# Patient Record
Sex: FEMALE | Race: WHITE | Employment: FULL TIME | URBAN - METROPOLITAN AREA
[De-identification: names, ages, dates, MRNs, and addresses within clinical notes are randomized per-mention and may not be internally consistent; named-entity substitution may affect disease eponyms.]

---

## 2017-01-12 ENCOUNTER — E-VISIT (OUTPATIENT)
Dept: FAMILY MEDICINE CLINIC | Age: 27
End: 2017-01-12

## 2017-01-12 PROCEDURE — 99444 PR PHYSICIAN ONLINE EVALUATION & MANAGEMENT SERVICE: CPT | Performed by: FAMILY MEDICINE

## 2017-01-12 RX ORDER — CEFUROXIME AXETIL 250 MG/1
250 TABLET ORAL 2 TIMES DAILY
Qty: 20 TABLET | Refills: 0 | Status: SHIPPED | OUTPATIENT
Start: 2017-01-12 | End: 2017-01-22

## 2017-01-12 ASSESSMENT — LIFESTYLE VARIABLES: SMOKING_STATUS: NO

## 2017-02-28 ENCOUNTER — OFFICE VISIT (OUTPATIENT)
Dept: FAMILY MEDICINE CLINIC | Age: 27
End: 2017-02-28

## 2017-02-28 VITALS
SYSTOLIC BLOOD PRESSURE: 132 MMHG | HEIGHT: 64 IN | DIASTOLIC BLOOD PRESSURE: 82 MMHG | TEMPERATURE: 97.9 F | BODY MASS INDEX: 26.67 KG/M2 | WEIGHT: 156.2 LBS

## 2017-02-28 DIAGNOSIS — M54.2 NECK PAIN: Primary | ICD-10-CM

## 2017-02-28 PROCEDURE — 99213 OFFICE O/P EST LOW 20 MIN: CPT | Performed by: FAMILY MEDICINE

## 2017-02-28 RX ORDER — PREDNISONE 10 MG/1
TABLET ORAL
Qty: 21 TABLET | Refills: 0 | Status: SHIPPED | OUTPATIENT
Start: 2017-02-28 | End: 2017-03-10

## 2017-02-28 RX ORDER — CYCLOBENZAPRINE HCL 10 MG
TABLET ORAL
Qty: 10 TABLET | Refills: 0 | Status: SHIPPED | OUTPATIENT
Start: 2017-02-28 | End: 2017-03-10

## 2017-03-06 ASSESSMENT — ENCOUNTER SYMPTOMS
SHORTNESS OF BREATH: 0
ABDOMINAL PAIN: 0

## 2017-10-31 ENCOUNTER — OFFICE VISIT (OUTPATIENT)
Dept: FAMILY MEDICINE CLINIC | Age: 27
End: 2017-10-31

## 2017-10-31 VITALS
TEMPERATURE: 98.4 F | WEIGHT: 168 LBS | DIASTOLIC BLOOD PRESSURE: 60 MMHG | HEIGHT: 64 IN | HEART RATE: 80 BPM | SYSTOLIC BLOOD PRESSURE: 110 MMHG | BODY MASS INDEX: 28.68 KG/M2

## 2017-10-31 DIAGNOSIS — Z23 NEED FOR INFLUENZA VACCINATION: ICD-10-CM

## 2017-10-31 DIAGNOSIS — M54.2 NECK PAIN ON RIGHT SIDE: Primary | ICD-10-CM

## 2017-10-31 PROCEDURE — 99213 OFFICE O/P EST LOW 20 MIN: CPT | Performed by: FAMILY MEDICINE

## 2017-10-31 PROCEDURE — 90686 IIV4 VACC NO PRSV 0.5 ML IM: CPT | Performed by: FAMILY MEDICINE

## 2017-10-31 PROCEDURE — 90471 IMMUNIZATION ADMIN: CPT | Performed by: FAMILY MEDICINE

## 2017-10-31 RX ORDER — CEPHALEXIN 500 MG/1
500 CAPSULE ORAL 3 TIMES DAILY
Qty: 30 CAPSULE | Refills: 0 | Status: SHIPPED | OUTPATIENT
Start: 2017-10-31 | End: 2017-11-10

## 2017-11-17 ASSESSMENT — ENCOUNTER SYMPTOMS
SHORTNESS OF BREATH: 0
ABDOMINAL PAIN: 0

## 2018-03-07 PROBLEM — O14.20 HELLP (HEMOLYTIC ANEMIA/ELEV LIVER ENZYMES/LOW PLATELETS IN PREGNANCY): Status: ACTIVE | Noted: 2018-03-07

## 2018-03-09 PROBLEM — O14.13 PREECLAMPSIA, SEVERE, THIRD TRIMESTER: Status: ACTIVE | Noted: 2018-03-09

## 2018-04-05 DIAGNOSIS — R89.9 ABNORMAL LABORATORY TEST RESULT: Primary | ICD-10-CM

## 2018-04-10 DIAGNOSIS — R89.9 ABNORMAL LABORATORY TEST RESULT: ICD-10-CM

## 2018-04-10 LAB
ALBUMIN SERPL-MCNC: 4.1 G/DL (ref 3.4–5)
ALP BLD-CCNC: 111 U/L (ref 40–129)
ALT SERPL-CCNC: 29 U/L (ref 10–40)
AST SERPL-CCNC: 23 U/L (ref 15–37)
BASOPHILS ABSOLUTE: 0 K/UL (ref 0–0.2)
BASOPHILS RELATIVE PERCENT: 0.5 %
BILIRUB SERPL-MCNC: 0.5 MG/DL (ref 0–1)
BILIRUBIN DIRECT: <0.2 MG/DL (ref 0–0.3)
BILIRUBIN, INDIRECT: NORMAL MG/DL (ref 0–1)
EOSINOPHILS ABSOLUTE: 1.2 K/UL (ref 0–0.6)
EOSINOPHILS RELATIVE PERCENT: 22.2 %
HCT VFR BLD CALC: 38.5 % (ref 36–48)
HEMOGLOBIN: 13.2 G/DL (ref 12–16)
LYMPHOCYTES ABSOLUTE: 1.4 K/UL (ref 1–5.1)
LYMPHOCYTES RELATIVE PERCENT: 26 %
MCH RBC QN AUTO: 31 PG (ref 26–34)
MCHC RBC AUTO-ENTMCNC: 34.2 G/DL (ref 31–36)
MCV RBC AUTO: 90.5 FL (ref 80–100)
MONOCYTES ABSOLUTE: 0.4 K/UL (ref 0–1.3)
MONOCYTES RELATIVE PERCENT: 6.9 %
NEUTROPHILS ABSOLUTE: 2.4 K/UL (ref 1.7–7.7)
NEUTROPHILS RELATIVE PERCENT: 44.4 %
PDW BLD-RTO: 13.6 % (ref 12.4–15.4)
PLATELET # BLD: 261 K/UL (ref 135–450)
PMV BLD AUTO: 7.2 FL (ref 5–10.5)
RBC # BLD: 4.25 M/UL (ref 4–5.2)
TOTAL PROTEIN: 6.5 G/DL (ref 6.4–8.2)
WBC # BLD: 5.4 K/UL (ref 4–11)

## 2018-11-14 RX ORDER — CEPHALEXIN 500 MG/1
500 CAPSULE ORAL 3 TIMES DAILY
Qty: 30 CAPSULE | Refills: 0 | Status: SHIPPED | OUTPATIENT
Start: 2018-11-14 | End: 2018-11-24

## 2019-06-04 ENCOUNTER — TELEPHONE (OUTPATIENT)
Dept: FAMILY MEDICINE CLINIC | Age: 29
End: 2019-06-04

## 2019-06-21 ENCOUNTER — OFFICE VISIT (OUTPATIENT)
Dept: FAMILY MEDICINE CLINIC | Age: 29
End: 2019-06-21
Payer: COMMERCIAL

## 2019-06-21 VITALS
WEIGHT: 153.2 LBS | DIASTOLIC BLOOD PRESSURE: 74 MMHG | SYSTOLIC BLOOD PRESSURE: 114 MMHG | BODY MASS INDEX: 26.15 KG/M2 | HEIGHT: 64 IN | TEMPERATURE: 98.5 F

## 2019-06-21 DIAGNOSIS — J01.90 ACUTE BACTERIAL SINUSITIS: Primary | ICD-10-CM

## 2019-06-21 DIAGNOSIS — B96.89 ACUTE BACTERIAL SINUSITIS: Primary | ICD-10-CM

## 2019-06-21 PROCEDURE — 99213 OFFICE O/P EST LOW 20 MIN: CPT | Performed by: INTERNAL MEDICINE

## 2019-06-21 RX ORDER — CEFUROXIME AXETIL 250 MG/1
250 TABLET ORAL 2 TIMES DAILY
Qty: 20 TABLET | Refills: 0 | Status: SHIPPED | OUTPATIENT
Start: 2019-06-21 | End: 2019-07-01

## 2019-06-21 ASSESSMENT — ENCOUNTER SYMPTOMS
BLOOD IN STOOL: 0
SINUS PAIN: 1
ABDOMINAL PAIN: 0
COUGH: 1
CONSTIPATION: 0
DIARRHEA: 0
SINUS PRESSURE: 1
SORE THROAT: 1

## 2019-06-21 ASSESSMENT — PATIENT HEALTH QUESTIONNAIRE - PHQ9
2. FEELING DOWN, DEPRESSED OR HOPELESS: 0
SUM OF ALL RESPONSES TO PHQ QUESTIONS 1-9: 0
1. LITTLE INTEREST OR PLEASURE IN DOING THINGS: 0
SUM OF ALL RESPONSES TO PHQ9 QUESTIONS 1 & 2: 0
SUM OF ALL RESPONSES TO PHQ QUESTIONS 1-9: 0

## 2019-06-21 NOTE — PROGRESS NOTES
stool, constipation and diarrhea. Genitourinary: Negative for dysuria. Objective:   Physical Exam   Constitutional: She appears well-developed and well-nourished. HENT:   Head: Normocephalic and atraumatic. Edema bilat. nasal turbinates with drainage . Cardiovascular: Normal rate and regular rhythm. Exam reveals no friction rub. No murmur heard. Pulmonary/Chest: No stridor. No respiratory distress. Abdominal: Soft. She exhibits no distension. There is no tenderness. Musculoskeletal: She exhibits no edema. Assessment:       Diagnosis Orders   1. Acute bacterial sinusitis           Plan:      Outpatient Encounter Medications as of 6/21/2019   Medication Sig Dispense Refill    cefUROXime (CEFTIN) 250 MG tablet Take 1 tablet by mouth 2 times daily for 10 days 20 tablet 0    [DISCONTINUED] ibuprofen (ADVIL;MOTRIN) 800 MG tablet Take 1 tablet by mouth every 8 hours as needed for Pain 120 tablet 3    [DISCONTINUED] docusate (COLACE, DULCOLAX) 100 MG CAPS Take 100 mg by mouth 2 times daily 30 capsule 1    [DISCONTINUED] lanolin ointment Apply topically as needed. 1 Tube 3    [DISCONTINUED] Prenatal Vit-Fe Fumarate-FA (PRENATAL VITAMIN PO) Take by mouth       No facility-administered encounter medications on file as of 6/21/2019. No orders of the defined types were placed in this encounter.           Blaise GLEZ DO

## 2019-06-21 NOTE — PATIENT INSTRUCTIONS
Thank you for choosing [facility]. Please bring a current list of medications to every appointment. Please contact your pharmacy for any prescription refill(s) that you are requesting.

## 2019-06-27 ENCOUNTER — TELEPHONE (OUTPATIENT)
Dept: FAMILY MEDICINE CLINIC | Age: 29
End: 2019-06-27

## 2019-06-27 DIAGNOSIS — Z00.00 ROUTINE GENERAL MEDICAL EXAMINATION AT A HEALTH CARE FACILITY: Primary | ICD-10-CM

## 2019-06-27 NOTE — TELEPHONE ENCOUNTER
Pt has an appt on July 19, 2019 for a cpe, pt is needing blood work prior to appt. Pl advise.  [phone] (home)

## 2019-06-28 NOTE — TELEPHONE ENCOUNTER
[phone] ([place])  - Left msg for Arianna Piedra stating that lab orders are in Epic and to be fasting at least 10-12 hours.

## 2019-07-12 DIAGNOSIS — Z00.00 ROUTINE GENERAL MEDICAL EXAMINATION AT A HEALTH CARE FACILITY: ICD-10-CM

## 2019-07-12 LAB
ANION GAP SERPL CALCULATED.3IONS-SCNC: 10 MMOL/L (ref 3–16)
BUN BLDV-MCNC: 13 MG/DL (ref 7–20)
CALCIUM SERPL-MCNC: 9.4 MG/DL (ref 8.3–10.6)
CHLORIDE BLD-SCNC: 105 MMOL/L (ref 99–110)
CHOLESTEROL, TOTAL: 177 MG/DL (ref 0–199)
CO2: 25 MMOL/L (ref 21–32)
CREAT SERPL-MCNC: 0.8 MG/DL (ref 0.6–1.1)
GFR AFRICAN AMERICAN: >60
GFR NON-AFRICAN AMERICAN: >60
GLUCOSE BLD-MCNC: 93 MG/DL (ref 70–99)
HDLC SERPL-MCNC: 55 MG/DL (ref 40–60)
LDL CHOLESTEROL CALCULATED: 112 MG/DL
POTASSIUM SERPL-SCNC: 5.3 MMOL/L (ref 3.5–5.1)
SODIUM BLD-SCNC: 140 MMOL/L (ref 136–145)
TRIGL SERPL-MCNC: 52 MG/DL (ref 0–150)
VLDLC SERPL CALC-MCNC: 10 MG/DL

## 2019-07-19 ENCOUNTER — OFFICE VISIT (OUTPATIENT)
Dept: FAMILY MEDICINE CLINIC | Age: 29
End: 2019-07-19
Payer: COMMERCIAL

## 2019-07-19 VITALS
BODY MASS INDEX: 26.46 KG/M2 | TEMPERATURE: 97.5 F | SYSTOLIC BLOOD PRESSURE: 120 MMHG | DIASTOLIC BLOOD PRESSURE: 78 MMHG | HEIGHT: 64 IN | WEIGHT: 155 LBS

## 2019-07-19 DIAGNOSIS — Z00.00 PREVENTATIVE HEALTH CARE: Primary | ICD-10-CM

## 2019-07-19 DIAGNOSIS — Z00.00 PREVENTATIVE HEALTH CARE: ICD-10-CM

## 2019-07-19 PROCEDURE — 90471 IMMUNIZATION ADMIN: CPT | Performed by: FAMILY MEDICINE

## 2019-07-19 PROCEDURE — 99395 PREV VISIT EST AGE 18-39: CPT | Performed by: FAMILY MEDICINE

## 2019-07-19 PROCEDURE — 90632 HEPA VACCINE ADULT IM: CPT | Performed by: FAMILY MEDICINE

## 2019-07-20 LAB
ALBUMIN SERPL-MCNC: 4.4 G/DL (ref 3.4–5)
ALP BLD-CCNC: 49 U/L (ref 40–129)
ALT SERPL-CCNC: 14 U/L (ref 10–40)
AST SERPL-CCNC: 21 U/L (ref 15–37)
BILIRUB SERPL-MCNC: 0.7 MG/DL (ref 0–1)
BILIRUBIN DIRECT: <0.2 MG/DL (ref 0–0.3)
BILIRUBIN, INDIRECT: NORMAL MG/DL (ref 0–1)
TOTAL PROTEIN: 6.8 G/DL (ref 6.4–8.2)

## 2019-08-05 ASSESSMENT — ENCOUNTER SYMPTOMS
BLOOD IN STOOL: 0
EYE DISCHARGE: 0
WHEEZING: 0
EYE REDNESS: 0
NAUSEA: 0
SHORTNESS OF BREATH: 0
CHEST TIGHTNESS: 0
VOMITING: 0
TROUBLE SWALLOWING: 0

## 2019-10-07 ENCOUNTER — NURSE ONLY (OUTPATIENT)
Dept: FAMILY MEDICINE CLINIC | Age: 29
End: 2019-10-07
Payer: COMMERCIAL

## 2019-10-07 DIAGNOSIS — Z23 NEEDS FLU SHOT: Primary | ICD-10-CM

## 2019-10-07 PROCEDURE — 90686 IIV4 VACC NO PRSV 0.5 ML IM: CPT | Performed by: FAMILY MEDICINE

## 2019-10-07 PROCEDURE — 90471 IMMUNIZATION ADMIN: CPT | Performed by: FAMILY MEDICINE

## 2019-12-02 LAB — HIV AG/AB: NORMAL

## 2020-10-02 ENCOUNTER — NURSE ONLY (OUTPATIENT)
Dept: FAMILY MEDICINE CLINIC | Age: 30
End: 2020-10-02
Payer: COMMERCIAL

## 2020-10-02 PROCEDURE — 90686 IIV4 VACC NO PRSV 0.5 ML IM: CPT | Performed by: INTERNAL MEDICINE

## 2020-10-02 PROCEDURE — 90471 IMMUNIZATION ADMIN: CPT | Performed by: INTERNAL MEDICINE

## 2020-11-02 ENCOUNTER — OFFICE VISIT (OUTPATIENT)
Dept: ORTHOPEDIC SURGERY | Age: 30
End: 2020-11-02
Payer: COMMERCIAL

## 2020-11-02 VITALS — HEIGHT: 64 IN | TEMPERATURE: 97.5 F | BODY MASS INDEX: 26.46 KG/M2 | WEIGHT: 155 LBS

## 2020-11-02 PROBLEM — M67.432 GANGLION CYST OF DORSUM OF LEFT WRIST: Status: ACTIVE | Noted: 2020-11-02

## 2020-11-02 PROCEDURE — G8427 DOCREV CUR MEDS BY ELIG CLIN: HCPCS | Performed by: ORTHOPAEDIC SURGERY

## 2020-11-02 PROCEDURE — 1036F TOBACCO NON-USER: CPT | Performed by: ORTHOPAEDIC SURGERY

## 2020-11-02 PROCEDURE — 99203 OFFICE O/P NEW LOW 30 MIN: CPT | Performed by: ORTHOPAEDIC SURGERY

## 2020-11-02 PROCEDURE — G8482 FLU IMMUNIZE ORDER/ADMIN: HCPCS | Performed by: ORTHOPAEDIC SURGERY

## 2020-11-02 PROCEDURE — G8419 CALC BMI OUT NRM PARAM NOF/U: HCPCS | Performed by: ORTHOPAEDIC SURGERY

## 2020-11-02 NOTE — PROGRESS NOTES
including the possible risks, complications, prognosis and postoperative care. All questions are answered. The cyst is too small and flat to be aspirated with a large needle at this time. If she feels that she would like to proceed with surgery, she will call me and be scheduled with Dr. Carolina Taylor.

## 2021-02-03 ENCOUNTER — HOSPITAL ENCOUNTER (OUTPATIENT)
Age: 31
Discharge: HOME | End: 2021-02-03
Payer: COMMERCIAL

## 2021-02-03 ENCOUNTER — OFFICE VISIT (OUTPATIENT)
Dept: FAMILY MEDICINE CLINIC | Age: 31
End: 2021-02-03
Payer: COMMERCIAL

## 2021-02-03 ENCOUNTER — HOSPITAL ENCOUNTER (OUTPATIENT)
Dept: GENERAL RADIOLOGY | Age: 31
Discharge: HOME | End: 2021-02-03
Payer: COMMERCIAL

## 2021-02-03 VITALS
OXYGEN SATURATION: 98 % | SYSTOLIC BLOOD PRESSURE: 116 MMHG | BODY MASS INDEX: 27.96 KG/M2 | WEIGHT: 163.8 LBS | HEART RATE: 65 BPM | HEIGHT: 64 IN | DIASTOLIC BLOOD PRESSURE: 76 MMHG | TEMPERATURE: 97.2 F

## 2021-02-03 DIAGNOSIS — M25.522 LEFT ELBOW PAIN: Primary | ICD-10-CM

## 2021-02-03 DIAGNOSIS — M25.522 LEFT ELBOW PAIN: ICD-10-CM

## 2021-02-03 PROCEDURE — 99213 OFFICE O/P EST LOW 20 MIN: CPT | Performed by: INTERNAL MEDICINE

## 2021-02-03 PROCEDURE — G8419 CALC BMI OUT NRM PARAM NOF/U: HCPCS | Performed by: INTERNAL MEDICINE

## 2021-02-03 PROCEDURE — 1036F TOBACCO NON-USER: CPT | Performed by: INTERNAL MEDICINE

## 2021-02-03 PROCEDURE — 73080 X-RAY EXAM OF ELBOW: CPT

## 2021-02-03 PROCEDURE — G8482 FLU IMMUNIZE ORDER/ADMIN: HCPCS | Performed by: INTERNAL MEDICINE

## 2021-02-03 PROCEDURE — G8427 DOCREV CUR MEDS BY ELIG CLIN: HCPCS | Performed by: INTERNAL MEDICINE

## 2021-02-03 RX ORDER — PHENOL 1.4 %
1 AEROSOL, SPRAY (ML) MUCOUS MEMBRANE DAILY
COMMUNITY

## 2021-02-03 RX ORDER — CYCLOBENZAPRINE HCL 5 MG
5 TABLET ORAL 3 TIMES DAILY PRN
Qty: 30 TABLET | Refills: 0 | Status: SHIPPED | OUTPATIENT
Start: 2021-02-03 | End: 2021-02-13

## 2021-02-03 RX ORDER — PREDNISONE 20 MG/1
TABLET ORAL
Qty: 11 TABLET | Refills: 0 | Status: SHIPPED | OUTPATIENT
Start: 2021-02-03 | End: 2021-08-10 | Stop reason: ALTCHOICE

## 2021-02-03 ASSESSMENT — PATIENT HEALTH QUESTIONNAIRE - PHQ9
2. FEELING DOWN, DEPRESSED OR HOPELESS: 0
SUM OF ALL RESPONSES TO PHQ QUESTIONS 1-9: 0
SUM OF ALL RESPONSES TO PHQ QUESTIONS 1-9: 0

## 2021-02-03 NOTE — PROGRESS NOTES
SUBJECTIVE:  Zuleyma Louie is a 27 y.o. female being evaluated for:    Chief Complaint   Patient presents with    Arm Pain     left arm pain x 3 days, no injury. hold's her 11 month old in her left arm not sure if related. taking ibu/tyl prn with relief. HPI   Left forearm pain and now going all the way up to her shoulder and down to her hand. Woke up on Sunday with it  Couldn't  her baby  Icing it and using tylenonl and motrin without relief Feels warn out like did a big work out  it feels tired  If moves it it feels very tight bradley in elbow and biceps area  Feels as if muscles are knotted   NO trauma to her arm    Allergies   Allergen Reactions    Neomycin-Polymyxin-Gramicidin Rash           Polysporin [Bacitracin-Polymyxin B] Rash     Rash/itching       Current Outpatient Medications   Medication Sig Dispense Refill    SPRINTEC 28 0.25-35 MG-MCG per tablet Take 1 tablet by mouth daily      Multiple Vitamins-Minerals (MULTIVITAMIN WOMEN) TABS Take 1 tablet by mouth daily      predniSONE (DELTASONE) 20 MG tablet 2 pills daily for 3 days, 1 pill daily for 3 days, 1/2 pill daily for 3 days 11 tablet 0    cyclobenzaprine (FLEXERIL) 5 MG tablet Take 1 tablet by mouth 3 times daily as needed for Muscle spasms 30 tablet 0     No current facility-administered medications for this visit. Social History     Socioeconomic History    Marital status:      Spouse name: Not on file    Number of children: Not on file    Years of education: Not on file    Highest education level: Not on file   Occupational History    Not on file   Social Needs    Financial resource strain: Not on file    Food insecurity     Worry: Not on file     Inability: Not on file    Transportation needs     Medical: Not on file     Non-medical: Not on file   Tobacco Use    Smoking status: Never Smoker    Smokeless tobacco: Never Used   Substance and Sexual Activity    Alcohol use: No    Drug use:  No  Sexual activity: Yes     Partners: Male   Lifestyle    Physical activity     Days per week: Not on file     Minutes per session: Not on file    Stress: Not on file   Relationships    Social connections     Talks on phone: Not on file     Gets together: Not on file     Attends Buddhism service: Not on file     Active member of club or organization: Not on file     Attends meetings of clubs or organizations: Not on file     Relationship status: Not on file    Intimate partner violence     Fear of current or ex partner: Not on file     Emotionally abused: Not on file     Physically abused: Not on file     Forced sexual activity: Not on file   Other Topics Concern    Not on file   Social History Narrative    Not on file      No past medical history on file. Past Surgical History:   Procedure Laterality Date    ADENOIDECTOMY AND TYMPANOSTOMY TUBE PLACEMENT       SECTION  2020    TONSILLECTOMY      TONSILLECTOMY AND ADENOIDECTOMY      WISDOM TOOTH EXTRACTION         Review of Systems   Constitutional: Negative for chills and fever. Respiratory: Negative for cough and shortness of breath. Cardiovascular: Negative for chest pain, palpitations and leg swelling. Gastrointestinal: Negative for abdominal pain, blood in stool, diarrhea, nausea and vomiting. Genitourinary: Negative for menstrual problem (started menses yesterday Not pregnant ). Musculoskeletal: Positive for arthralgias and myalgias. Negative for neck pain. Neurological: Negative for dizziness, light-headedness and headaches. OBJECTIVE:  /76   Pulse 65   Temp 97.2 °F (36.2 °C) (Temporal)   Ht 5' 4\" (1.626 m)   Wt 163 lb 12.8 oz (74.3 kg)   LMP 2021 (Exact Date)   SpO2 98%   Breastfeeding No   BMI 28.12 kg/m²      Body mass index is 28.12 kg/m². Physical Exam  Constitutional:       General: She is not in acute distress. Appearance: Normal appearance.    HENT: Head: Normocephalic and atraumatic. Eyes:      Conjunctiva/sclera: Conjunctivae normal.   Neck:      Musculoskeletal: Normal range of motion and neck supple. No neck rigidity or muscular tenderness. Cardiovascular:      Rate and Rhythm: Normal rate and regular rhythm. Heart sounds: Normal heart sounds. No murmur. No friction rub. No gallop. Pulmonary:      Effort: Pulmonary effort is normal.      Breath sounds: Normal breath sounds. Abdominal:      General: There is no distension. Palpations: Abdomen is soft. Tenderness: There is no abdominal tenderness. Comments: No hepatosplenomegaly   Musculoskeletal:         General: Tenderness present. No swelling. Comments: Tenderness over biceps in the left arm and left medial epicondylar area   Lymphadenopathy:      Cervical: No cervical adenopathy. Skin:     General: Skin is warm and dry. Findings: No erythema. Neurological:      General: No focal deficit present. Mental Status: She is alert. Gait: Gait normal.         ASSESSMENT/PLAN:    Negar Barajas was seen today for arm pain. Diagnoses and all orders for this visit:    Left elbow pain  -     XR ELBOW LEFT (MIN 3 VIEWS); Future  -     predniSONE (DELTASONE) 20 MG tablet; 2 pills daily for 3 days, 1 pill daily for 3 days, 1/2 pill daily for 3 days  -     cyclobenzaprine (FLEXERIL) 5 MG tablet; Take 1 tablet by mouth 3 times daily as needed for Muscle spasms        Orders Placed This Encounter   Medications    predniSONE (DELTASONE) 20 MG tablet     Si pills daily for 3 days, 1 pill daily for 3 days, 1/2 pill daily for 3 days     Dispense:  11 tablet     Refill:  0    cyclobenzaprine (FLEXERIL) 5 MG tablet     Sig: Take 1 tablet by mouth 3 times daily as needed for Muscle spasms     Dispense:  30 tablet     Refill:  0        Return if symptoms worsen or fail to improve. There are no Patient Instructions on file for this visit.

## 2021-02-13 ASSESSMENT — ENCOUNTER SYMPTOMS
SHORTNESS OF BREATH: 0
NAUSEA: 0
DIARRHEA: 0
COUGH: 0
ABDOMINAL PAIN: 0
VOMITING: 0
BLOOD IN STOOL: 0

## 2021-07-15 ENCOUNTER — TELEPHONE (OUTPATIENT)
Dept: FAMILY MEDICINE CLINIC | Age: 31
End: 2021-07-15

## 2021-07-19 DIAGNOSIS — Z76.89 ENCOUNTER TO ESTABLISH CARE: Primary | ICD-10-CM

## 2021-07-26 DIAGNOSIS — Z76.89 ENCOUNTER TO ESTABLISH CARE: ICD-10-CM

## 2021-07-26 LAB
A/G RATIO: 1.8 (ref 1.1–2.2)
ALBUMIN SERPL-MCNC: 4.3 G/DL (ref 3.4–5)
ALP BLD-CCNC: 60 U/L (ref 40–129)
ALT SERPL-CCNC: 21 U/L (ref 10–40)
ANION GAP SERPL CALCULATED.3IONS-SCNC: 10 MMOL/L (ref 3–16)
AST SERPL-CCNC: 18 U/L (ref 15–37)
BILIRUB SERPL-MCNC: 0.7 MG/DL (ref 0–1)
BUN BLDV-MCNC: 14 MG/DL (ref 7–20)
CALCIUM SERPL-MCNC: 9.1 MG/DL (ref 8.3–10.6)
CHLORIDE BLD-SCNC: 100 MMOL/L (ref 99–110)
CO2: 27 MMOL/L (ref 21–32)
CREAT SERPL-MCNC: 0.9 MG/DL (ref 0.6–1.1)
GFR AFRICAN AMERICAN: >60
GFR NON-AFRICAN AMERICAN: >60
GLOBULIN: 2.4 G/DL
GLUCOSE BLD-MCNC: 75 MG/DL (ref 70–99)
HCT VFR BLD CALC: 41.4 % (ref 36–48)
HEMOGLOBIN: 14.5 G/DL (ref 12–16)
MCH RBC QN AUTO: 31.5 PG (ref 26–34)
MCHC RBC AUTO-ENTMCNC: 35.1 G/DL (ref 31–36)
MCV RBC AUTO: 89.8 FL (ref 80–100)
PDW BLD-RTO: 12.4 % (ref 12.4–15.4)
PLATELET # BLD: NORMAL K/UL (ref 135–450)
PLATELET SLIDE REVIEW: NORMAL
PMV BLD AUTO: NORMAL FL (ref 5–10.5)
POTASSIUM SERPL-SCNC: 4.6 MMOL/L (ref 3.5–5.1)
RBC # BLD: 4.61 M/UL (ref 4–5.2)
SODIUM BLD-SCNC: 137 MMOL/L (ref 136–145)
TOTAL PROTEIN: 6.7 G/DL (ref 6.4–8.2)
WBC # BLD: 7 K/UL (ref 4–11)

## 2021-08-10 ENCOUNTER — OFFICE VISIT (OUTPATIENT)
Dept: FAMILY MEDICINE CLINIC | Age: 31
End: 2021-08-10
Payer: COMMERCIAL

## 2021-08-10 VITALS
TEMPERATURE: 98.6 F | DIASTOLIC BLOOD PRESSURE: 82 MMHG | HEART RATE: 91 BPM | OXYGEN SATURATION: 98 % | WEIGHT: 161 LBS | SYSTOLIC BLOOD PRESSURE: 120 MMHG | HEIGHT: 64 IN | BODY MASS INDEX: 27.49 KG/M2

## 2021-08-10 DIAGNOSIS — D69.1 ABNORMAL PLATELET AGGREGATION (HCC): ICD-10-CM

## 2021-08-10 DIAGNOSIS — M67.432 GANGLION CYST OF DORSUM OF LEFT WRIST: ICD-10-CM

## 2021-08-10 DIAGNOSIS — Z76.89 ENCOUNTER TO ESTABLISH CARE: Primary | ICD-10-CM

## 2021-08-10 PROBLEM — O14.20 HELLP (HEMOLYTIC ANEMIA/ELEV LIVER ENZYMES/LOW PLATELETS IN PREGNANCY): Status: RESOLVED | Noted: 2018-03-07 | Resolved: 2021-08-10

## 2021-08-10 PROBLEM — O14.13 PREECLAMPSIA, SEVERE, THIRD TRIMESTER: Status: RESOLVED | Noted: 2018-03-09 | Resolved: 2021-08-10

## 2021-08-10 PROCEDURE — G8419 CALC BMI OUT NRM PARAM NOF/U: HCPCS | Performed by: NURSE PRACTITIONER

## 2021-08-10 PROCEDURE — 1036F TOBACCO NON-USER: CPT | Performed by: NURSE PRACTITIONER

## 2021-08-10 PROCEDURE — 99203 OFFICE O/P NEW LOW 30 MIN: CPT | Performed by: NURSE PRACTITIONER

## 2021-08-10 PROCEDURE — G8427 DOCREV CUR MEDS BY ELIG CLIN: HCPCS | Performed by: NURSE PRACTITIONER

## 2021-08-10 RX ORDER — UBIDECARENONE 75 MG
50 CAPSULE ORAL DAILY
COMMUNITY
End: 2022-07-15

## 2021-08-10 SDOH — ECONOMIC STABILITY: FOOD INSECURITY: WITHIN THE PAST 12 MONTHS, YOU WORRIED THAT YOUR FOOD WOULD RUN OUT BEFORE YOU GOT MONEY TO BUY MORE.: PATIENT DECLINED

## 2021-08-10 SDOH — ECONOMIC STABILITY: FOOD INSECURITY: WITHIN THE PAST 12 MONTHS, THE FOOD YOU BOUGHT JUST DIDN'T LAST AND YOU DIDN'T HAVE MONEY TO GET MORE.: PATIENT DECLINED

## 2021-08-10 ASSESSMENT — ENCOUNTER SYMPTOMS
DIARRHEA: 0
APNEA: 0
CONSTIPATION: 0
ABDOMINAL PAIN: 0
TROUBLE SWALLOWING: 0
COUGH: 0
BLOOD IN STOOL: 0
BACK PAIN: 1
COLOR CHANGE: 0
VOMITING: 0
NAUSEA: 0
EYES NEGATIVE: 1
WHEEZING: 0
SHORTNESS OF BREATH: 0

## 2021-08-10 ASSESSMENT — SOCIAL DETERMINANTS OF HEALTH (SDOH): HOW HARD IS IT FOR YOU TO PAY FOR THE VERY BASICS LIKE FOOD, HOUSING, MEDICAL CARE, AND HEATING?: PATIENT DECLINED

## 2021-08-10 NOTE — PROGRESS NOTES
HISTORY AND PHYSICAL             Date: 8/10/2021        Patient Name: Michelle Leary     YOB: 1990      Age:  27 y.o. Chief Complaint     Chief Complaint   Patient presents with    Established New Doctor    Annual Exam          History Obtained From   patient    History of Present Illness   Presents today to establish care. Overall feels well and denies concerns or complaints. Goes to her chiropractor every other month for neck issues. 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 The cyst to her left wrist is chronic and ongoing. Seen ortho in past and discussed risks and benefits of surgical removal.  States she uses NSAIDs and brace as needed when her wrist hurts. History of HELLP syndrome with first child not second. However she reported her blood work often needed redrawn due to clumping. Past Medical History     Past Medical History:   Diagnosis Date    HELLP (hemolytic anemia/elev liver enzymes/low platelets in pregnancy) 3/7/2018    3/6/18 - 36w1d - emergent C/S    Preeclampsia, severe, third trimester 3/9/2018    Single delivery by  section 3/7/2018    3/6/18 - LTCS for HELLP @ 36 weeks        Past Surgical History     Past Surgical History:   Procedure Laterality Date    ADENOIDECTOMY AND TYMPANOSTOMY TUBE PLACEMENT       SECTION  2020    TONSILLECTOMY      TONSILLECTOMY AND ADENOIDECTOMY      WISDOM TOOTH EXTRACTION          Medications Prior to Admission     Prior to Admission medications    Medication Sig Start Date End Date Taking?  Authorizing Provider   vitamin B-12 (CYANOCOBALAMIN) 100 MCG tablet Take 50 mcg by mouth daily   Yes Historical Provider, MD   [address] [address] 0.25-35 MG-MCG per tablet Take 1 tablet by mouth daily 21  Yes Historical Provider, MD   Multiple Vitamins-Minerals (MULTIVITAMIN WOMEN) TABS Take 1 tablet by mouth daily   Yes Historical Provider, MD        Allergies Neomycin-polymyxin-gramicidin and Polysporin [bacitracin-polymyxin b]    Social History     Social History     Tobacco History     Smoking Status  Never Smoker    Smokeless Tobacco Use  Never Used          Alcohol History     Alcohol Use Status  No          Drug Use     Drug Use Status  No          Sexual Activity     Sexually Active  Yes Partners  Male                Family History     Family History   Problem Relation Age of Onset    High Blood Pressure Mother     High Blood Pressure Father     Breast Cancer Maternal Aunt     Early Death Maternal Aunt     Miscarriages / Stillbirths Maternal Aunt     Early Death Maternal Uncle     Other Maternal Uncle     Colon Cancer Paternal Grandmother     No Known Problems Maternal Grandmother     No Known Problems Maternal Grandfather     No Known Problems Paternal Grandfather        Review of Systems   Review of Systems   Constitutional: Negative for activity change, fatigue, fever and unexpected weight change. HENT: Negative for congestion, nosebleeds and trouble swallowing. Eyes: Negative. Respiratory: Negative for apnea, cough, shortness of breath and wheezing. Cardiovascular: Negative for chest pain, palpitations and leg swelling. Gastrointestinal: Negative for abdominal pain, blood in stool, constipation, diarrhea, nausea and vomiting. Endocrine: Negative for cold intolerance, heat intolerance, polydipsia, polyphagia and polyuria. Genitourinary: Negative for difficulty urinating, dysuria, flank pain, frequency, menstrual problem (BCP) and urgency. Musculoskeletal: Positive for arthralgias (neck, back and wrist.) and back pain. Negative for gait problem, joint swelling and myalgias. Skin: Negative for color change, rash and wound. Neurological: Positive for numbness (intermittent right arm, relieved with chiropractor treatment to her neck). Negative for dizziness, weakness, light-headedness and headaches.    Hematological: Does not bruise/bleed easily. Psychiatric/Behavioral: Negative for dysphoric mood and suicidal ideas. The patient is not nervous/anxious. Physical Exam   /82 (Site: Left Upper Arm, Position: Sitting, Cuff Size: Medium Adult)   Pulse 91   Temp 98.6 °F (37 °C) (Oral)   Ht 5' 4\" (1.626 m)   Wt 161 lb (73 kg)   SpO2 98%   BMI 27.64 kg/m²     Physical Exam  Constitutional:       General: She is not in acute distress. Appearance: She is normal weight. She is not ill-appearing. HENT:      Head: Normocephalic. Right Ear: Tympanic membrane, ear canal and external ear normal. There is no impacted cerumen. Left Ear: Tympanic membrane, ear canal and external ear normal. There is no impacted cerumen. Nose: No congestion or rhinorrhea. Mouth/Throat:      Pharynx: No oropharyngeal exudate or posterior oropharyngeal erythema. Eyes:      Conjunctiva/sclera: Conjunctivae normal.      Pupils: Pupils are equal, round, and reactive to light. Neck:      Vascular: No carotid bruit. Cardiovascular:      Rate and Rhythm: Normal rate and regular rhythm. Pulses: Normal pulses. Heart sounds: Normal heart sounds. No murmur heard. No friction rub. No gallop. Pulmonary:      Effort: Pulmonary effort is normal. No respiratory distress. Breath sounds: Normal breath sounds. No wheezing or rhonchi. Abdominal:      General: Bowel sounds are normal. There is no distension. Palpations: Abdomen is soft. There is no hepatomegaly, splenomegaly or mass. Tenderness: There is no abdominal tenderness. There is no right CVA tenderness or left CVA tenderness. Musculoskeletal:         General: No swelling, tenderness, deformity or signs of injury. Normal range of motion. Cervical back: Normal range of motion and neck supple. No tenderness. Right lower leg: No edema. Left lower leg: No edema. Lymphadenopathy:      Cervical: No cervical adenopathy.    Skin:     General: Skin is warm and dry. Capillary Refill: Capillary refill takes less than 2 seconds. Findings: Lesion (cyst to dorsal side of left wrist) present. No bruising, erythema or rash. Neurological:      General: No focal deficit present. Mental Status: She is alert and oriented to person, place, and time. Sensory: No sensory deficit. Motor: No weakness. Coordination: Coordination normal.   Psychiatric:         Mood and Affect: Mood normal.         Labs      Orders Only on 07/26/2021   Component Date Value Ref Range Status    Sodium 07/26/2021 137  136 - 145 mmol/L Final    Potassium 07/26/2021 4.6  3.5 - 5.1 mmol/L Final    Chloride 07/26/2021 100  99 - 110 mmol/L Final    CO2 07/26/2021 27  21 - 32 mmol/L Final    Anion Gap 07/26/2021 10  3 - 16 Final    Glucose 07/26/2021 75  70 - 99 mg/dL Final    BUN 07/26/2021 14  7 - 20 mg/dL Final    CREATININE 07/26/2021 0.9  0.6 - 1.1 mg/dL Final    GFR Non- 07/26/2021 >60  >60 Final    Comment: >60 mL/min/1.73m2 EGFR, calc. for ages 25 and older using the  MDRD formula (not corrected for weight), is valid for stable  renal function.  GFR  07/26/2021 >60  >60 Final    Comment: Chronic Kidney Disease: less than 60 ml/min/1.73 sq.m. Kidney Failure: less than 15 ml/min/1.73 sq.m. Results valid for patients 18 years and older.       Calcium 07/26/2021 9.1  8.3 - 10.6 mg/dL Final    Total Protein 07/26/2021 6.7  6.4 - 8.2 g/dL Final    Albumin 07/26/2021 4.3  3.4 - 5.0 g/dL Final    Albumin/Globulin Ratio 07/26/2021 1.8  1.1 - 2.2 Final    Total Bilirubin 07/26/2021 0.7  0.0 - 1.0 mg/dL Final    Alkaline Phosphatase 07/26/2021 60  40 - 129 U/L Final    ALT 07/26/2021 21  10 - 40 U/L Final    AST 07/26/2021 18  15 - 37 U/L Final    Globulin 07/26/2021 2.4  g/dL Final    WBC 07/26/2021 7.0  4.0 - 11.0 K/uL Final    RBC 07/26/2021 4.61  4.00 - 5.20 M/uL Final    Hemoglobin 07/26/2021 14.5 12.0 - 16.0 g/dL Final    Hematocrit 07/26/2021 41.4  36.0 - 48.0 % Final    MCV 07/26/2021 89.8  80.0 - 100.0 fL Final    MCH 07/26/2021 31.5  26.0 - 34.0 pg Final    MCHC 07/26/2021 35.1  31.0 - 36.0 g/dL Final    RDW 07/26/2021 12.4  12.4 - 15.4 % Final    Platelets 97/05/3806 see below  135 - 450 K/uL Final    Comment: Result unavailable due to platelet clumps seen on smear,  suggest redraw with lavender and blue top tubes  for accurate platelet count.  MPV 07/26/2021 see below  5.0 - 10.5 fL Final    Comment: Result unavailable due to platelet clumps seen on smear,  suggest redraw with lavender and blue top tubes  for accurate platelet count.  PLATELET SLIDE REVIEW 07/26/2021 Clumped   Final          Assessment & Plan   1. Encounter to establish care  Reviewed PMH, medications and labs     2. Ganglion cyst of dorsum of left wrist  Ongoing, treats with brace and OTC NSAIDs as needed    3. Abnormal platelet aggregation (HCC)  Repeat platelet count today in blue top, suggested by lab  - PLATELET COUNT;  Future         Electronically signed by SEE Nichols NP on 8/10/21 at 9:08 AM EDT

## 2021-08-10 NOTE — PATIENT INSTRUCTIONS
Patient Education        Ganglions: Care Instructions  Your Care Instructions     A ganglion is a small sac, or cyst, filled with a clear fluid that is like jelly. A ganglion may look like a bump on the hand or wrist. It also can appear on your feet, ankles, knees, or shoulders. It is not cancer. A ganglion can grow out of the protective area, or capsule, around a joint. It also can grow on a tendon sheath, which covers the ropelike tendons that connect muscle to bone. A ganglion may hurt or cause numbness if it presses on a nerve. Many ganglions do not need treatment, and they often go away on their own. But if a ganglion hurts, becomes larger, causes numbness, or limits your activity, your doctor may want to drain it with a needle and syringe or remove it with minor surgery. Follow-up care is a key part of your treatment and safety. Be sure to make and go to all appointments, and call your doctor if you are having problems. It's also a good idea to know your test results and keep a list of the medicines you take. How can you care for yourself at home? · Wear a wrist or finger splint as directed by your doctor. It will keep your wrist or hand from moving and help reduce the fluid in the cyst. This may be all you need for the ganglion to shrink and go away. · Do not smash a ganglion with a book or other heavy object. You may break a bone or otherwise injure your wrist by trying this folk remedy, and the ganglion may return anyway. · Do not try to drain the fluid by poking the ganglion with a pin or any other sharp object. You could cause an infection. When should you call for help? Call your doctor now or seek immediate medical care if:    · You have signs of infection, such as:  ? Increased pain, swelling, warmth, or redness. ? Red streaks leading from the cyst.  ? Pus draining from the cyst.  ? A fever.    Watch closely for changes in your health, and be sure to contact your doctor if:    · You have increasing pain.     · Your ganglion is getting larger.     · You still have pain or numbness from a ganglion. Where can you learn more? Go to https://"[employer]pepiceweb.Matchbox. org and sign in to your [employer] account. Enter P149 in the [employer]ox box to learn more about \"Ganglions: Care Instructions. \"     If you do not have an account, please click on the \"Sign Up Now\" link. Current as of: November 16, 2020               Content Version: 12.9  © 4444-8780 [employer]. Care instructions adapted under license by [facility] ([facility]). If you have questions about a medical condition or this instruction, always ask your healthcare professional. [address] any warranty or liability for your use of this information. Patient Education        Breast Self-Exam: Care Instructions  Your Care Instructions     A breast self-exam is when you check your breasts for lumps or changes. This regular exam helps you learn how your breasts normally look and feel. Most breast problems or changes are not because of cancer. Breast self-exam is not a substitute for a mammogram. Having regular breast exams by your doctor and regular mammograms improve your chances of finding any problems with your breasts. Some women set a time each month to do a step-by-step breast self-exam. Other women like a less formal system. They might look at their breasts as they brush their teeth, or feel their breasts once in a while in the shower. If you notice a change in your breast, tell your doctor. Follow-up care is a key part of your treatment and safety. Be sure to make and go to all appointments, and call your doctor if you are having problems. It's also a good idea to know your test results and keep a list of the medicines you take. How do you do a breast self-exam?  · The best time to examine your breasts is usually one week after your menstrual period begins.  Your breasts should not be tender then. If you do not have periods, you might do your exam on a day of the month that is easy to remember. · To examine your breasts:  ? Remove all your clothes above the waist and lie down. When you are lying down, your breast tissue spreads evenly over your chest wall, which makes it easier to feel all your breast tissue. ? Use the pads--not the fingertips--of the 3 middle fingers of your left hand to check your right breast. Move your fingers slowly in small coin-sized circles that overlap. ? Use three levels of pressure to feel of all your breast tissue. Use light pressure to feel the tissue close to the skin surface. Use medium pressure to feel a little deeper. Use firm pressure to feel your tissue close to your breastbone and ribs. Use each pressure level to feel your breast tissue before moving on to the next spot. ? Check your entire breast, moving up and down as if following a strip from the collarbone to the bra line, and from the armpit to the ribs. Repeat until you have covered the entire breast.  ? Repeat this procedure for your left breast, using the pads of the 3 middle fingers of your right hand. · To examine your breasts while in the shower:  ? Place one arm over your head and lightly soap your breast on that side. ? Using the pads of your fingers, gently move your hand over your breast (in the strip pattern described above), feeling carefully for any lumps or changes. ? Repeat for the other breast.  · Have your doctor inspect anything you notice to see if you need further testing. Where can you learn more? Go to https://[employer]. org and sign in to your [employer] account. Enter P148 in the [facility] box to learn more about \"Breast Self-Exam: Care Instructions. \"     If you do not have an account, please click on the \"Sign Up Now\" link. Current as of: December 17, 2020               Content Version: 12.9  © 2454-0411 [employer], [facility].    Care instructions adapted under license by [facility] ([facility]). If you have questions about a medical condition or this instruction, always ask your healthcare professional. [address] any warranty or liability for your use of this information.

## 2021-11-05 ENCOUNTER — IMMUNIZATION (OUTPATIENT)
Dept: FAMILY MEDICINE CLINIC | Age: 31
End: 2021-11-05
Payer: COMMERCIAL

## 2021-11-05 DIAGNOSIS — Z23 NEEDS FLU SHOT: Primary | ICD-10-CM

## 2021-11-05 PROCEDURE — 90471 IMMUNIZATION ADMIN: CPT | Performed by: NURSE PRACTITIONER

## 2021-11-05 PROCEDURE — 90674 CCIIV4 VAC NO PRSV 0.5 ML IM: CPT | Performed by: NURSE PRACTITIONER

## 2021-11-05 NOTE — PROGRESS NOTES
Vaccine Information Sheet, \"Influenza - Inactivated\"  given to Richelle Forbess, or parent/legal guardian of  Richelle Kumari and verbalized understanding. Patient responses:    Have you ever had a reaction to a flu vaccine? No  Do you have any current illness? No  Have you ever had [place] Syndrome? No  Do you have a serious allergy to any of the follow: Neomycin, Polymyxin, Thimerosal, eggs or egg products? No    Flu vaccine given per order. Please see immunization tab. Risks and benefits explained. Current VIS given.

## 2022-01-17 ENCOUNTER — E-VISIT (OUTPATIENT)
Dept: FAMILY MEDICINE CLINIC | Age: 32
End: 2022-01-17
Payer: COMMERCIAL

## 2022-01-17 DIAGNOSIS — J02.0 ACUTE STREPTOCOCCAL PHARYNGITIS: Primary | ICD-10-CM

## 2022-01-17 PROCEDURE — 99422 OL DIG E/M SVC 11-20 MIN: CPT | Performed by: NURSE PRACTITIONER

## 2022-01-17 RX ORDER — AMOXICILLIN 500 MG/1
500 CAPSULE ORAL 2 TIMES DAILY
Qty: 20 CAPSULE | Refills: 0 | Status: SHIPPED | OUTPATIENT
Start: 2022-01-17 | End: 2022-01-27

## 2022-01-17 ASSESSMENT — LIFESTYLE VARIABLES: SMOKING_STATUS: NO, I'VE NEVER SMOKED

## 2022-01-17 NOTE — PROGRESS NOTES
Sherrie Trevino (:  1990) is a 32 y.o. female,Established patient, here for evaluation of the following chief complaint(s):  No chief complaint on file. ASSESSMENT/PLAN:  1. Acute streptococcal pharyngitis  Amoxicillin. Follow pt instructions. Follow up in Urgent care if no improvement or symptoms worsen in 24-48 hrs. Subjective   SUBJECTIVE/OBJECTIVE:  HPI  Pt presents today via Evisit. She has nasal congestion, sore throat with white patches and was exposed to two people with strep throat. Pt reported a history of strep throat with similar symptoms. Pt prescribed amoxicillin. Pt instructed to follow up at an urgent care if no improvement or symptoms worsen in 24-48 hrs on the antibiotic to be tested for influenza and COVID. Review of Systems      HENT- nasal congestion, green drainage, sore throat with white patches. Resp-denies cough, chest congestion, sob     Objective   Physical Exam  Deferred, evisit  An electronic signature was used to authenticate this note.     --SEE Dawn - NP

## 2022-01-17 NOTE — PATIENT INSTRUCTIONS
If no improvement in 24-48 hrs, go to urgent care for evalutation. Get tested for influenza and COVID. Stay well hydrated, drink at least 64 oz of water a day. Keep nutrition up with high protein snacks and or drinks. Take tylenol for fevers and advil for body aches.

## 2022-01-18 RX ORDER — METHYLPREDNISOLONE 4 MG/1
TABLET ORAL
Qty: 1 KIT | Refills: 0 | Status: SHIPPED | OUTPATIENT
Start: 2022-01-18 | End: 2022-01-24

## 2022-06-28 ENCOUNTER — E-VISIT (OUTPATIENT)
Dept: FAMILY MEDICINE CLINIC | Age: 32
End: 2022-06-28
Payer: COMMERCIAL

## 2022-06-28 DIAGNOSIS — H10.029 OTHER MUCOPURULENT CONJUNCTIVITIS, UNSPECIFIED LATERALITY: Primary | ICD-10-CM

## 2022-06-28 PROCEDURE — 99422 OL DIG E/M SVC 11-20 MIN: CPT | Performed by: NURSE PRACTITIONER

## 2022-06-28 RX ORDER — OFLOXACIN 3 MG/ML
1 SOLUTION/ DROPS OPHTHALMIC 4 TIMES DAILY
Qty: 10 ML | Refills: 0 | Status: SHIPPED | OUTPATIENT
Start: 2022-06-28 | End: 2022-07-08

## 2022-06-28 NOTE — PATIENT INSTRUCTIONS
Treatment with an antibiotic eye drop or ointment for five to seven days. Redness, irritation, and eye discharge should begin to improve within 24 to 48 hours. If there is no improvement or if the condition worsens within this time, you should be evaluated by an ophthalmologist.    The safest approach to avoid spreading viral and bacterial conjunctivitis to others is to stay home until there is no longer any discharge from the eye(s). However, this is not practical for most students and for those who work outside the home. You should use at least 24 hours of antibiotic eye drops or ointment before returning to school/work. This treatment helps to prevent the spread of bacterial conjunctivitis, but is not necessary or helpful for children with viral conjunctivitis.

## 2022-06-30 ENCOUNTER — TELEPHONE (OUTPATIENT)
Dept: FAMILY MEDICINE CLINIC | Age: 32
End: 2022-06-30

## 2022-06-30 RX ORDER — AMOXICILLIN AND CLAVULANATE POTASSIUM 875; 125 MG/1; MG/1
1 TABLET, FILM COATED ORAL 2 TIMES DAILY
Qty: 14 TABLET | Refills: 0 | Status: SHIPPED | OUTPATIENT
Start: 2022-06-30 | End: 2022-07-07

## 2022-07-15 ENCOUNTER — OFFICE VISIT (OUTPATIENT)
Dept: FAMILY MEDICINE CLINIC | Age: 32
End: 2022-07-15
Payer: COMMERCIAL

## 2022-07-15 VITALS
OXYGEN SATURATION: 98 % | TEMPERATURE: 98.6 F | HEART RATE: 82 BPM | HEIGHT: 64 IN | WEIGHT: 159 LBS | DIASTOLIC BLOOD PRESSURE: 60 MMHG | BODY MASS INDEX: 27.14 KG/M2 | SYSTOLIC BLOOD PRESSURE: 104 MMHG

## 2022-07-15 DIAGNOSIS — J02.9 SORE THROAT: ICD-10-CM

## 2022-07-15 DIAGNOSIS — R05.9 COUGH: Primary | ICD-10-CM

## 2022-07-15 PROCEDURE — 1036F TOBACCO NON-USER: CPT | Performed by: NURSE PRACTITIONER

## 2022-07-15 PROCEDURE — G8419 CALC BMI OUT NRM PARAM NOF/U: HCPCS | Performed by: NURSE PRACTITIONER

## 2022-07-15 PROCEDURE — G8427 DOCREV CUR MEDS BY ELIG CLIN: HCPCS | Performed by: NURSE PRACTITIONER

## 2022-07-15 PROCEDURE — 99213 OFFICE O/P EST LOW 20 MIN: CPT | Performed by: NURSE PRACTITIONER

## 2022-07-15 RX ORDER — ALBUTEROL SULFATE 90 UG/1
2 AEROSOL, METERED RESPIRATORY (INHALATION) 4 TIMES DAILY PRN
Qty: 18 G | Refills: 0 | Status: SHIPPED | OUTPATIENT
Start: 2022-07-15

## 2022-07-15 RX ORDER — FLUTICASONE PROPIONATE 50 MCG
1 SPRAY, SUSPENSION (ML) NASAL DAILY
Qty: 16 G | Refills: 2 | Status: SHIPPED | OUTPATIENT
Start: 2022-07-15

## 2022-07-15 RX ORDER — BENZONATATE 100 MG/1
100 CAPSULE ORAL 3 TIMES DAILY PRN
Qty: 30 CAPSULE | Refills: 0 | Status: SHIPPED | OUTPATIENT
Start: 2022-07-15 | End: 2022-07-22

## 2022-07-15 RX ORDER — METHYLPREDNISOLONE 4 MG/1
TABLET ORAL
Qty: 1 KIT | Refills: 0 | Status: SHIPPED | OUTPATIENT
Start: 2022-07-15 | End: 2022-07-21

## 2022-07-15 ASSESSMENT — PATIENT HEALTH QUESTIONNAIRE - PHQ9
SUM OF ALL RESPONSES TO PHQ QUESTIONS 1-9: 0
SUM OF ALL RESPONSES TO PHQ QUESTIONS 1-9: 0
SUM OF ALL RESPONSES TO PHQ9 QUESTIONS 1 & 2: 0
SUM OF ALL RESPONSES TO PHQ QUESTIONS 1-9: 0
SUM OF ALL RESPONSES TO PHQ QUESTIONS 1-9: 0
2. FEELING DOWN, DEPRESSED OR HOPELESS: 0
1. LITTLE INTEREST OR PLEASURE IN DOING THINGS: 0

## 2022-07-15 NOTE — PROGRESS NOTES
Herbie Winn (:  1990) is a 32 y.o. female,Established patient, here for evaluation of the following chief complaint(s): Other (Right lung feels tight, sore throat off and on )         ASSESSMENT/PLAN:  1. Cough  Take medications as prescribed. Follow pt education and instructions included in AVS.     2. Sore throat  Ongoing, use flonase to help with PND. Warm salt water gargles and use throat spray/lozenges. Return if symptoms worsen or fail to improve. Subjective   SUBJECTIVE/OBJECTIVE:  HPI  3wks ago had sinus infection, treated with antibiotics and had some improvement. Over the last week, developed congested cough, took tessalon pearls with relief. Over the weekend had sob, cough, congestion ,relief with sons albuterol inhaler. Mucinex d, robitussin with little relief. Sore throat, cough, dry more at night. States she was around camp fire and made chest feel tight, more in her right lung. Denies sob, fevers, n/v, dizziness or syncope. Current Outpatient Medications   Medication Sig Dispense Refill    albuterol sulfate HFA (VENTOLIN HFA) 108 (90 Base) MCG/ACT inhaler Inhale 2 puffs into the lungs 4 times daily as needed for Wheezing 18 g 0    methylPREDNISolone (MEDROL DOSEPACK) 4 MG tablet Take by mouth. 1 kit 0    benzonatate (TESSALON) 100 MG capsule Take 1 capsule by mouth 3 times daily as needed for Cough 30 capsule 0    fluticasone (FLONASE) 50 MCG/ACT nasal spray 1 spray by Each Nostril route in the morning. 16 g 2    SPRINTEC 28 0.25-35 MG-MCG per tablet Take 1 tablet by mouth daily      Multiple Vitamins-Minerals (MULTIVITAMIN WOMEN) TABS Take 1 tablet by mouth daily       No current facility-administered medications for this visit.         Past Medical History:   Diagnosis Date    HELLP (hemolytic anemia/elev liver enzymes/low platelets in pregnancy) 3/7/2018    3/6/18 - 36w1d - emergent C/S    Preeclampsia, severe, third trimester 3/9/2018    Single delivery by

## 2022-07-19 ASSESSMENT — ENCOUNTER SYMPTOMS
ABDOMINAL PAIN: 0
WHEEZING: 0
CHEST TIGHTNESS: 1
NAUSEA: 0
COUGH: 1
VOMITING: 0

## 2022-11-28 ENCOUNTER — NURSE ONLY (OUTPATIENT)
Dept: FAMILY MEDICINE CLINIC | Age: 32
End: 2022-11-28
Payer: COMMERCIAL

## 2022-11-28 DIAGNOSIS — Z23 NEED FOR INFLUENZA VACCINATION: Primary | ICD-10-CM

## 2022-11-28 PROCEDURE — 90471 IMMUNIZATION ADMIN: CPT | Performed by: NURSE PRACTITIONER

## 2022-11-28 PROCEDURE — 90674 CCIIV4 VAC NO PRSV 0.5 ML IM: CPT | Performed by: NURSE PRACTITIONER

## 2023-01-03 ENCOUNTER — TELEPHONE (OUTPATIENT)
Dept: FAMILY MEDICINE CLINIC | Age: 33
End: 2023-01-03

## 2023-01-03 NOTE — TELEPHONE ENCOUNTER
This could just be allergies with all the weather changes, recommend adding daily allergy pill along with nasal spray, flonase or nasocort. If symptoms do not improve in a few days, recommend OV, VV or Evisit.

## 2023-01-03 NOTE — TELEPHONE ENCOUNTER
Pt has been sick for 3 days. No fever, no sob. Has a productive green cough in the mornings. Pt tried Robitussin chest and congestion and Motrin that she is currently taking. Taking left over Tessalon Pearls from last time she was sick. Pt was just wondering if she needs an abx since she was coughing up green in the mornings.    Pt has not Covid tested

## 2023-01-06 ENCOUNTER — E-VISIT (OUTPATIENT)
Dept: FAMILY MEDICINE CLINIC | Age: 33
End: 2023-01-06
Payer: COMMERCIAL

## 2023-01-06 DIAGNOSIS — B96.89 ACUTE BACTERIAL SINUSITIS: Primary | ICD-10-CM

## 2023-01-06 DIAGNOSIS — J01.90 ACUTE BACTERIAL SINUSITIS: Primary | ICD-10-CM

## 2023-01-06 PROCEDURE — 99421 OL DIG E/M SVC 5-10 MIN: CPT | Performed by: NURSE PRACTITIONER

## 2023-01-06 RX ORDER — METHYLPREDNISOLONE 4 MG/1
TABLET ORAL
Qty: 1 KIT | Refills: 0 | Status: SHIPPED | OUTPATIENT
Start: 2023-01-06 | End: 2023-01-12

## 2023-01-06 RX ORDER — AZITHROMYCIN 250 MG/1
250 TABLET, FILM COATED ORAL SEE ADMIN INSTRUCTIONS
Qty: 6 TABLET | Refills: 0 | Status: SHIPPED | OUTPATIENT
Start: 2023-01-06 | End: 2023-01-11

## 2023-01-06 ASSESSMENT — LIFESTYLE VARIABLES: SMOKING_STATUS: NO, I'VE NEVER SMOKED

## 2023-01-06 NOTE — PATIENT INSTRUCTIONS
Follow attached instructions/education. Use flonase BID for the next 2 weeks. Take steroid and antibiotics as prescribed. If no improvement or symptoms worsen, please follow up in office or go to urgent care for evaluation.

## 2023-09-22 ENCOUNTER — NURSE ONLY (OUTPATIENT)
Dept: FAMILY MEDICINE CLINIC | Age: 33
End: 2023-09-22
Payer: COMMERCIAL

## 2023-09-22 DIAGNOSIS — Z23 NEED FOR IMMUNIZATION AGAINST INFLUENZA: Primary | ICD-10-CM

## 2023-09-22 PROCEDURE — 90674 CCIIV4 VAC NO PRSV 0.5 ML IM: CPT | Performed by: NURSE PRACTITIONER

## 2023-09-22 PROCEDURE — 90471 IMMUNIZATION ADMIN: CPT | Performed by: NURSE PRACTITIONER

## 2023-11-29 ENCOUNTER — E-VISIT (OUTPATIENT)
Dept: PRIMARY CARE CLINIC | Age: 33
End: 2023-11-29
Payer: COMMERCIAL

## 2023-11-29 DIAGNOSIS — J06.9 UPPER RESPIRATORY TRACT INFECTION, UNSPECIFIED TYPE: Primary | ICD-10-CM

## 2023-11-29 PROCEDURE — 99422 OL DIG E/M SVC 11-20 MIN: CPT | Performed by: NURSE PRACTITIONER

## 2023-11-29 RX ORDER — AMOXICILLIN AND CLAVULANATE POTASSIUM 875; 125 MG/1; MG/1
1 TABLET, FILM COATED ORAL 2 TIMES DAILY
Qty: 20 TABLET | Refills: 0 | Status: SHIPPED | OUTPATIENT
Start: 2023-11-29 | End: 2023-12-09

## 2023-11-29 ASSESSMENT — LIFESTYLE VARIABLES: SMOKING_STATUS: NO, I'VE NEVER SMOKED

## 2023-12-01 ENCOUNTER — TELEPHONE (OUTPATIENT)
Dept: FAMILY MEDICINE CLINIC | Age: 33
End: 2023-12-01

## 2023-12-01 ENCOUNTER — OFFICE VISIT (OUTPATIENT)
Dept: FAMILY MEDICINE CLINIC | Age: 33
End: 2023-12-01

## 2023-12-01 VITALS
SYSTOLIC BLOOD PRESSURE: 106 MMHG | DIASTOLIC BLOOD PRESSURE: 78 MMHG | HEIGHT: 64 IN | OXYGEN SATURATION: 96 % | BODY MASS INDEX: 28.13 KG/M2 | WEIGHT: 164.8 LBS | HEART RATE: 97 BPM | TEMPERATURE: 98.3 F

## 2023-12-01 DIAGNOSIS — J98.8 WHEEZING-ASSOCIATED RESPIRATORY INFECTION (WARI): ICD-10-CM

## 2023-12-01 DIAGNOSIS — J98.8 WHEEZING-ASSOCIATED RESPIRATORY INFECTION (WARI): Primary | ICD-10-CM

## 2023-12-01 RX ORDER — FLUTICASONE PROPIONATE 50 MCG
1 SPRAY, SUSPENSION (ML) NASAL DAILY PRN
Qty: 16 G | Refills: 0 | Status: SHIPPED | OUTPATIENT
Start: 2023-12-01 | End: 2023-12-11

## 2023-12-01 RX ORDER — IPRATROPIUM BROMIDE AND ALBUTEROL SULFATE 2.5; .5 MG/3ML; MG/3ML
1 SOLUTION RESPIRATORY (INHALATION) EVERY 6 HOURS PRN
Qty: 360 ML | Refills: 1 | Status: SHIPPED | OUTPATIENT
Start: 2023-12-01 | End: 2023-12-01 | Stop reason: SDUPTHER

## 2023-12-01 RX ORDER — METHYLPREDNISOLONE 4 MG/1
TABLET ORAL
Qty: 1 KIT | Refills: 0 | Status: SHIPPED | OUTPATIENT
Start: 2023-12-01 | End: 2023-12-07

## 2023-12-01 RX ORDER — ALBUTEROL SULFATE 90 UG/1
2 AEROSOL, METERED RESPIRATORY (INHALATION) 4 TIMES DAILY PRN
Qty: 18 G | Refills: 0 | Status: SHIPPED | OUTPATIENT
Start: 2023-12-01

## 2023-12-01 RX ORDER — IPRATROPIUM BROMIDE AND ALBUTEROL SULFATE 2.5; .5 MG/3ML; MG/3ML
1 SOLUTION RESPIRATORY (INHALATION) ONCE
Status: COMPLETED | OUTPATIENT
Start: 2023-12-01 | End: 2023-12-01

## 2023-12-01 RX ORDER — IPRATROPIUM BROMIDE AND ALBUTEROL SULFATE 2.5; .5 MG/3ML; MG/3ML
1 SOLUTION RESPIRATORY (INHALATION) EVERY 6 HOURS PRN
Qty: 360 ML | Refills: 1 | Status: SHIPPED | OUTPATIENT
Start: 2023-12-01

## 2023-12-01 RX ADMIN — IPRATROPIUM BROMIDE AND ALBUTEROL SULFATE 1 DOSE: 2.5; .5 SOLUTION RESPIRATORY (INHALATION) at 14:30

## 2023-12-01 SDOH — ECONOMIC STABILITY: INCOME INSECURITY: HOW HARD IS IT FOR YOU TO PAY FOR THE VERY BASICS LIKE FOOD, HOUSING, MEDICAL CARE, AND HEATING?: NOT VERY HARD

## 2023-12-01 SDOH — ECONOMIC STABILITY: HOUSING INSECURITY
IN THE LAST 12 MONTHS, WAS THERE A TIME WHEN YOU DID NOT HAVE A STEADY PLACE TO SLEEP OR SLEPT IN A SHELTER (INCLUDING NOW)?: NO

## 2023-12-01 SDOH — ECONOMIC STABILITY: FOOD INSECURITY: WITHIN THE PAST 12 MONTHS, YOU WORRIED THAT YOUR FOOD WOULD RUN OUT BEFORE YOU GOT MONEY TO BUY MORE.: NEVER TRUE

## 2023-12-01 SDOH — ECONOMIC STABILITY: FOOD INSECURITY: WITHIN THE PAST 12 MONTHS, THE FOOD YOU BOUGHT JUST DIDN'T LAST AND YOU DIDN'T HAVE MONEY TO GET MORE.: NEVER TRUE

## 2023-12-01 ASSESSMENT — ENCOUNTER SYMPTOMS
CHEST TIGHTNESS: 1
WHEEZING: 1
RHINORRHEA: 1
SHORTNESS OF BREATH: 1
COUGH: 1
ABDOMINAL PAIN: 0

## 2023-12-01 ASSESSMENT — PATIENT HEALTH QUESTIONNAIRE - PHQ9
SUM OF ALL RESPONSES TO PHQ QUESTIONS 1-9: 0
SUM OF ALL RESPONSES TO PHQ QUESTIONS 1-9: 0
1. LITTLE INTEREST OR PLEASURE IN DOING THINGS: 0
SUM OF ALL RESPONSES TO PHQ QUESTIONS 1-9: 0
2. FEELING DOWN, DEPRESSED OR HOPELESS: 0
SUM OF ALL RESPONSES TO PHQ QUESTIONS 1-9: 0
SUM OF ALL RESPONSES TO PHQ9 QUESTIONS 1 & 2: 0

## 2023-12-01 NOTE — TELEPHONE ENCOUNTER
Pt would like to try the [facility] across the street to get her nebulizer and the ipratropium.  [facility] will not have it in until Monday

## 2023-12-01 NOTE — PROGRESS NOTES
Trish Prado (:  1990) is a 35 y.o. female,Established patient, here for evaluation of the following chief complaint(s):  Cough (Was sick right before Thanksgiving with sinus issues. Ran a 5K. As of 5 days ago started getting worse and as of last night it was hard to breathe. No fever. Having nasal congestion, runny nose, headaches. Sore throat is gone. Has bilateral ear pressure. Tried inhaler and neti pot. Has wheezing. Sometimes when couging she feels like she is drowning in it. Covid negative 2 days ago. Hx of bronchitis. RSV and pneumonia going around . Taken Mucinex, cough drops. )         ASSESSMENT/PLAN:  1. Wheezing-associated respiratory infection (WARI)  New. Take steroids and flonase as prescribed, use HHN every 4-6 hr for the next 2-3 days. Continue Augmentin as prescribed. - ipratropium 0.5 mg-albuterol 2.5 mg (DUONEB) nebulizer solution 1 Dose       Return if symptoms worsen or fail to improve. Subjective   SUBJECTIVE/OBJECTIVE:  HPI  Presents today for ongoing cough, chest tightness and wheezing. States she is on day 3 of Augmentin, Sinus congestion and head/ears are doing better. Endorses wheezing and sob with activity and ongoing cough. Denies fevers, n/v/d. Current Outpatient Medications   Medication Sig Dispense Refill    MAGNESIUM PO Take by mouth      methylPREDNISolone (MEDROL DOSEPACK) 4 MG tablet Take by mouth.  1 kit 0    fluticasone (FLONASE) 50 MCG/ACT nasal spray 1 spray by Each Nostril route daily as needed for Rhinitis (cough) 16 g 0    Nebulizers (AIRIAL COMPACT MINI NEBULIZER) MISC 1 each by Does not apply route continuous 1 each 0    ipratropium 0.5 mg-albuterol 2.5 mg (DUONEB) 0.5-2.5 (3) MG/3ML SOLN nebulizer solution Inhale 3 mLs into the lungs every 6 hours as needed for Shortness of Breath 360 mL 1    albuterol sulfate HFA (VENTOLIN HFA) 108 (90 Base) MCG/ACT inhaler Inhale 2 puffs into the lungs 4 times daily as needed for Wheezing 18 g 0

## 2023-12-01 NOTE — TELEPHONE ENCOUNTER
----- Message from Anthony Christina sent at 12/1/2023  3:54 PM EST -----  Subject: Medication Problem    Medication: Nebulizers (AIRIAL COMPACT MINI NEBULIZER) MISC  Dosage: mini nebulizer  Ordering Provider: rosa vaughan    Question/Problem: phar did not have could this be called into the pharm   that she said would have it. ?  [facility] DID NOT HAVE!!!!      Pharmacy: [facility] [id number] - [address] [facility] [address], [address] [place] [address]   [phone] Mariel Wong [phone]    ---------------------------------------------------------------------------  --------------  Yonathan Ivan INFO  [phone]; OK to leave message on voicemail  ---------------------------------------------------------------------------  --------------    SCRIPT ANSWERS  Relationship to Patient: Self

## 2023-12-01 NOTE — TELEPHONE ENCOUNTER
----- Message from Maritza Neil sent at 12/1/2023  3:54 PM EST -----  Subject: Medication Problem    Medication: Nebulizers (AIRIAL COMPACT MINI NEBULIZER) MISC  Dosage: mini nebulizer  Ordering Provider: rosa vaughan    Question/Problem: phar did not have could this be called into the pharm   that she said would have it. ?  [facility] DID NOT HAVE!!!!      Pharmacy: [facility] [phone] - [address] [facility] [address] [place] [address]   [phone] Viv Patel [phone]    ---------------------------------------------------------------------------  --------------  Shelli Cooley [facility] INFO  [phone]; OK to leave message on voicemail  ---------------------------------------------------------------------------  --------------    SCRIPT ANSWERS  Relationship to Patient: Self

## 2023-12-04 ENCOUNTER — HOSPITAL ENCOUNTER (OUTPATIENT)
Age: 33
Discharge: HOME | End: 2023-12-04
Payer: COMMERCIAL

## 2023-12-04 ENCOUNTER — PATIENT MESSAGE (OUTPATIENT)
Dept: FAMILY MEDICINE CLINIC | Age: 33
End: 2023-12-04

## 2023-12-04 ENCOUNTER — HOSPITAL ENCOUNTER (OUTPATIENT)
Dept: GENERAL RADIOLOGY | Age: 33
Discharge: HOME | End: 2023-12-04
Payer: COMMERCIAL

## 2023-12-04 DIAGNOSIS — R05.2 SUBACUTE COUGH: ICD-10-CM

## 2023-12-04 DIAGNOSIS — Z77.120 MOLD EXPOSURE: ICD-10-CM

## 2023-12-04 DIAGNOSIS — R05.2 SUBACUTE COUGH: Primary | ICD-10-CM

## 2023-12-04 PROCEDURE — 71046 X-RAY EXAM CHEST 2 VIEWS: CPT

## 2023-12-04 RX ORDER — BENZONATATE 200 MG/1
200 CAPSULE ORAL 3 TIMES DAILY PRN
Qty: 30 CAPSULE | Refills: 0 | Status: SHIPPED | OUTPATIENT
Start: 2023-12-04 | End: 2023-12-08

## 2023-12-06 ENCOUNTER — APPOINTMENT (OUTPATIENT)
Dept: GENERAL RADIOLOGY | Age: 33
End: 2023-12-06
Payer: COMMERCIAL

## 2023-12-06 ENCOUNTER — TELEPHONE (OUTPATIENT)
Dept: FAMILY MEDICINE CLINIC | Age: 33
End: 2023-12-06

## 2023-12-06 ENCOUNTER — HOSPITAL ENCOUNTER (EMERGENCY)
Age: 33
Discharge: HOME | End: 2023-12-06
Payer: COMMERCIAL

## 2023-12-06 ENCOUNTER — APPOINTMENT (OUTPATIENT)
Dept: CT IMAGING | Age: 33
End: 2023-12-06
Payer: COMMERCIAL

## 2023-12-06 VITALS
OXYGEN SATURATION: 94 % | HEART RATE: 101 BPM | HEIGHT: 64 IN | TEMPERATURE: 98 F | WEIGHT: 158.29 LBS | DIASTOLIC BLOOD PRESSURE: 79 MMHG | RESPIRATION RATE: 22 BRPM | SYSTOLIC BLOOD PRESSURE: 110 MMHG | BODY MASS INDEX: 27.02 KG/M2

## 2023-12-06 DIAGNOSIS — J20.9 BRONCHITIS WITH BRONCHOSPASM: Primary | ICD-10-CM

## 2023-12-06 LAB
ALBUMIN SERPL-MCNC: 4.5 G/DL (ref 3.4–5)
ALBUMIN/GLOB SERPL: 1.8 {RATIO} (ref 1.1–2.2)
ALP SERPL-CCNC: 78 U/L (ref 40–129)
ALT SERPL-CCNC: 34 U/L (ref 10–40)
ANION GAP SERPL CALCULATED.3IONS-SCNC: 13 MMOL/L (ref 3–16)
AST SERPL-CCNC: 34 U/L (ref 15–37)
B-HCG SERPL EIA 3RD IS-ACNC: <5 MIU/ML
BASOPHILS # BLD: 0 K/UL (ref 0–0.2)
BASOPHILS NFR BLD: 0.4 %
BILIRUB SERPL-MCNC: 0.5 MG/DL (ref 0–1)
BUN SERPL-MCNC: 7 MG/DL (ref 7–20)
CALCIUM SERPL-MCNC: 9.4 MG/DL (ref 8.3–10.6)
CHLORIDE SERPL-SCNC: 102 MMOL/L (ref 99–110)
CO2 SERPL-SCNC: 23 MMOL/L (ref 21–32)
CREAT SERPL-MCNC: 0.6 MG/DL (ref 0.6–1.1)
D DIMER: 0.42 UG/ML FEU (ref 0–0.6)
DEPRECATED RDW RBC AUTO: 12.3 % (ref 12.4–15.4)
EOSINOPHIL # BLD: 0.5 K/UL (ref 0–0.6)
EOSINOPHIL NFR BLD: 3.6 %
FLUAV RNA UPPER RESP QL NAA+PROBE: NEGATIVE
FLUBV AG NPH QL: NEGATIVE
GFR SERPLBLD CREATININE-BSD FMLA CKD-EPI: >60 ML/MIN/{1.73_M2}
GLUCOSE SERPL-MCNC: 107 MG/DL (ref 70–99)
HCT VFR BLD AUTO: 42.7 % (ref 36–48)
HGB BLD-MCNC: 14.7 G/DL (ref 12–16)
LACTATE BLDV-SCNC: 1 MMOL/L (ref 0.4–1.9)
LYMPHOCYTES # BLD: 1 K/UL (ref 1–5.1)
LYMPHOCYTES NFR BLD: 7.6 %
MCH RBC QN AUTO: 31.5 PG (ref 26–34)
MCHC RBC AUTO-ENTMCNC: 34.5 G/DL (ref 31–36)
MCV RBC AUTO: 91.4 FL (ref 80–100)
MONOCYTES # BLD: 0.7 K/UL (ref 0–1.3)
MONOCYTES NFR BLD: 5.7 %
NEUTROPHILS # BLD: 10.7 K/UL (ref 1.7–7.7)
NEUTROPHILS NFR BLD: 82.7 %
NT-PROBNP SERPL-MCNC: <36 PG/ML (ref 0–124)
PLATELET # BLD AUTO: 296 K/UL (ref 135–450)
PMV BLD AUTO: 8.3 FL (ref 5–10.5)
POTASSIUM SERPL-SCNC: 4.4 MMOL/L (ref 3.5–5.1)
PROT SERPL-MCNC: 7 G/DL (ref 6.4–8.2)
RBC # BLD AUTO: 4.67 M/UL (ref 4–5.2)
SARS-COV-2 RDRP RESP QL NAA+PROBE: NOT DETECTED
SODIUM SERPL-SCNC: 138 MMOL/L (ref 136–145)
TROPONIN, HIGH SENSITIVITY: <6 NG/L (ref 0–14)
WBC # BLD AUTO: 12.9 K/UL (ref 4–11)

## 2023-12-06 PROCEDURE — 71260 CT THORAX DX C+: CPT

## 2023-12-06 PROCEDURE — 85025 COMPLETE CBC W/AUTO DIFF WBC: CPT

## 2023-12-06 PROCEDURE — 36415 COLL VENOUS BLD VENIPUNCTURE: CPT

## 2023-12-06 PROCEDURE — 96375 TX/PRO/DX INJ NEW DRUG ADDON: CPT

## 2023-12-06 PROCEDURE — 6370000000 HC RX 637 (ALT 250 FOR IP): Performed by: GENERAL ACUTE CARE HOSPITAL

## 2023-12-06 PROCEDURE — 6360000004 HC RX CONTRAST MEDICATION: Performed by: GENERAL ACUTE CARE HOSPITAL

## 2023-12-06 PROCEDURE — 6360000002 HC RX W HCPCS: Performed by: GENERAL ACUTE CARE HOSPITAL

## 2023-12-06 PROCEDURE — 85379 FIBRIN DEGRADATION QUANT: CPT

## 2023-12-06 PROCEDURE — 84484 ASSAY OF TROPONIN QUANT: CPT

## 2023-12-06 PROCEDURE — 84702 CHORIONIC GONADOTROPIN TEST: CPT

## 2023-12-06 PROCEDURE — 83605 ASSAY OF LACTIC ACID: CPT

## 2023-12-06 PROCEDURE — 87804 INFLUENZA ASSAY W/OPTIC: CPT

## 2023-12-06 PROCEDURE — 87635 SARS-COV-2 COVID-19 AMP PRB: CPT

## 2023-12-06 PROCEDURE — 87040 BLOOD CULTURE FOR BACTERIA: CPT

## 2023-12-06 PROCEDURE — 94760 N-INVAS EAR/PLS OXIMETRY 1: CPT

## 2023-12-06 PROCEDURE — 83880 ASSAY OF NATRIURETIC PEPTIDE: CPT

## 2023-12-06 PROCEDURE — 99285 EMERGENCY DEPT VISIT HI MDM: CPT

## 2023-12-06 PROCEDURE — 80053 COMPREHEN METABOLIC PANEL: CPT

## 2023-12-06 PROCEDURE — 94640 AIRWAY INHALATION TREATMENT: CPT

## 2023-12-06 PROCEDURE — 93005 ELECTROCARDIOGRAM TRACING: CPT | Performed by: EMERGENCY MEDICINE

## 2023-12-06 PROCEDURE — 96374 THER/PROPH/DIAG INJ IV PUSH: CPT

## 2023-12-06 PROCEDURE — 71045 X-RAY EXAM CHEST 1 VIEW: CPT

## 2023-12-06 RX ORDER — DEXAMETHASONE SODIUM PHOSPHATE 4 MG/ML
10 INJECTION, SOLUTION INTRA-ARTICULAR; INTRALESIONAL; INTRAMUSCULAR; INTRAVENOUS; SOFT TISSUE ONCE
Status: COMPLETED | OUTPATIENT
Start: 2023-12-06 | End: 2023-12-06

## 2023-12-06 RX ORDER — DEXTROMETHORPHAN HYDROBROMIDE AND PROMETHAZINE HYDROCHLORIDE 15; 6.25 MG/5ML; MG/5ML
5 SYRUP ORAL 4 TIMES DAILY PRN
Qty: 118 ML | Refills: 0 | Status: SHIPPED | OUTPATIENT
Start: 2023-12-06 | End: 2023-12-13

## 2023-12-06 RX ORDER — SODIUM CHLORIDE 0.9 % (FLUSH) 0.9 %
5-40 SYRINGE (ML) INJECTION PRN
Status: DISCONTINUED | OUTPATIENT
Start: 2023-12-06 | End: 2023-12-06 | Stop reason: HOSPADM

## 2023-12-06 RX ORDER — IPRATROPIUM BROMIDE AND ALBUTEROL SULFATE 2.5; .5 MG/3ML; MG/3ML
1 SOLUTION RESPIRATORY (INHALATION) ONCE
Status: COMPLETED | OUTPATIENT
Start: 2023-12-06 | End: 2023-12-06

## 2023-12-06 RX ORDER — SODIUM CHLORIDE 0.9 % (FLUSH) 0.9 %
5-40 SYRINGE (ML) INJECTION EVERY 12 HOURS SCHEDULED
Status: DISCONTINUED | OUTPATIENT
Start: 2023-12-06 | End: 2023-12-06 | Stop reason: HOSPADM

## 2023-12-06 RX ORDER — GUAIFENESIN 600 MG/1
600 TABLET, EXTENDED RELEASE ORAL 2 TIMES DAILY
Qty: 30 TABLET | Refills: 0 | Status: SHIPPED | OUTPATIENT
Start: 2023-12-06 | End: 2023-12-21

## 2023-12-06 RX ORDER — HYDROCODONE BITARTRATE AND HOMATROPINE METHYLBROMIDE ORAL SOLUTION 5; 1.5 MG/5ML; MG/5ML
5 LIQUID ORAL ONCE
Status: COMPLETED | OUTPATIENT
Start: 2023-12-06 | End: 2023-12-06

## 2023-12-06 RX ORDER — SODIUM CHLORIDE 9 MG/ML
INJECTION, SOLUTION INTRAVENOUS PRN
Status: DISCONTINUED | OUTPATIENT
Start: 2023-12-06 | End: 2023-12-06 | Stop reason: HOSPADM

## 2023-12-06 RX ORDER — KETOROLAC TROMETHAMINE 30 MG/ML
30 INJECTION, SOLUTION INTRAMUSCULAR; INTRAVENOUS ONCE
Status: COMPLETED | OUTPATIENT
Start: 2023-12-06 | End: 2023-12-06

## 2023-12-06 RX ADMIN — HYDROCODONE BITARTRATE AND HOMATROPINE METHYLBROMIDE 5 ML: 5; 1.5 SOLUTION ORAL at 11:24

## 2023-12-06 RX ADMIN — IOPAMIDOL 75 ML: 755 INJECTION, SOLUTION INTRAVENOUS at 12:33

## 2023-12-06 RX ADMIN — IPRATROPIUM BROMIDE AND ALBUTEROL SULFATE 1 DOSE: .5; 3 SOLUTION RESPIRATORY (INHALATION) at 11:51

## 2023-12-06 RX ADMIN — KETOROLAC TROMETHAMINE 30 MG: 30 INJECTION, SOLUTION INTRAMUSCULAR at 14:30

## 2023-12-06 RX ADMIN — DEXAMETHASONE SODIUM PHOSPHATE 10 MG: 4 INJECTION, SOLUTION INTRAMUSCULAR; INTRAVENOUS at 11:24

## 2023-12-06 ASSESSMENT — ENCOUNTER SYMPTOMS
VOMITING: 0
CHEST TIGHTNESS: 1
SORE THROAT: 0
NAUSEA: 0
BACK PAIN: 0
SHORTNESS OF BREATH: 1
COUGH: 1
WHEEZING: 0
VOICE CHANGE: 0
ABDOMINAL PAIN: 0

## 2023-12-06 ASSESSMENT — PAIN - FUNCTIONAL ASSESSMENT
PAIN_FUNCTIONAL_ASSESSMENT: 0-10
PAIN_FUNCTIONAL_ASSESSMENT: NONE - DENIES PAIN

## 2023-12-06 ASSESSMENT — PAIN SCALES - GENERAL
PAINLEVEL_OUTOF10: 0
PAINLEVEL_OUTOF10: 2

## 2023-12-06 NOTE — ED PROVIDER NOTES
[address] [facility] [address] [address]        Pt Name: Oziel Zambrano  MRN: [id number]  [address] [facility] [place] 1990  Date of evaluation: 12/6/2023  Provider: SEE Jones CNP  PCP: SEE Aguirre NP  Note Started: 2:08 PM EST 12/6/23      LORENZA. I have evaluated this patient. CHIEF COMPLAINT       Chief Complaint   Patient presents with    Shortness of Breath     Pt had cough, sore throat, phlegm in chest x 2 weeks. Just finished abx & steroids. Given nebulizer q4 hours prn for SOB. Pt still feels SOB and chest tightness Pt reports has coughed up green& brown phlegm. Pt reports right side of chest of chest feels clear, left side feels \"full of junk\"       HISTORY OF PRESENT ILLNESS: 1 or more Elements     History From: Patient  Limitations to history : None    Oziel Zambrano is a 35 y.o. female who presents to the emergency department today reporting approximate 2-week history of URI symptoms. Patient reports completing a course of Augmentin and steroid pack with little relief of her symptoms. Patient states that she was given a home nebulizer to use which helps for only a short period of time. Patient reports contacting her PCP who advised that she come to the emergency department. Patient states that she feels more congested on the left side of her lungs. She denies recent travel or known sick contacts. She is a non-smoker. There is no history of COPD or asthma. No history of DVTs or PEs. She denies recent mobilization or surgery. She denies hemoptysis. She denies any lower extremity pain or swelling. She reports intermittent chills but no fever. She denies nausea, vomiting, diarrhea, or rash. Nursing Notes were all reviewed and agreed with or any disagreements were addressed in the HPI. REVIEW OF SYSTEMS :      Review of Systems   Constitutional:  Negative for chills, fatigue and fever. HENT:  Positive for congestion.  Negative

## 2023-12-06 NOTE — ED NOTES
Pt discharged at this time. Discharge instructions and medications reviewed,  Questions were answered. PT verbalized understanding. VSS, Afebrile. Follow up appointments were discussed.          Jacquie Vines RN  12/06/23 6939

## 2023-12-06 NOTE — DISCHARGE INSTRUCTIONS
Increase your water intake. Take the prescribed Phenergan DM and Mucinex as directed. Use nebulizer and solution as directed. Follow-up with your primary care physician within the next 2 to 3 days. Return to nearest ED for new or worsening symptoms.

## 2023-12-07 LAB
EKG ATRIAL RATE: 106 BPM
EKG DIAGNOSIS: NORMAL
EKG P AXIS: 70 DEGREES
EKG P-R INTERVAL: 120 MS
EKG Q-T INTERVAL: 328 MS
EKG QRS DURATION: 72 MS
EKG QTC CALCULATION (BAZETT): 435 MS
EKG R AXIS: 79 DEGREES
EKG T AXIS: 68 DEGREES
EKG VENTRICULAR RATE: 106 BPM

## 2023-12-07 PROCEDURE — 93010 ELECTROCARDIOGRAM REPORT: CPT | Performed by: INTERNAL MEDICINE

## 2023-12-08 ENCOUNTER — OFFICE VISIT (OUTPATIENT)
Dept: FAMILY MEDICINE CLINIC | Age: 33
End: 2023-12-08
Payer: COMMERCIAL

## 2023-12-08 VITALS
WEIGHT: 161.2 LBS | DIASTOLIC BLOOD PRESSURE: 62 MMHG | OXYGEN SATURATION: 97 % | SYSTOLIC BLOOD PRESSURE: 114 MMHG | BODY MASS INDEX: 27.52 KG/M2 | HEART RATE: 84 BPM | TEMPERATURE: 98.2 F | HEIGHT: 64 IN

## 2023-12-08 DIAGNOSIS — R09.1 PLEURISY: ICD-10-CM

## 2023-12-08 DIAGNOSIS — J40 BRONCHITIS: Primary | ICD-10-CM

## 2023-12-08 PROCEDURE — 99213 OFFICE O/P EST LOW 20 MIN: CPT | Performed by: NURSE PRACTITIONER

## 2023-12-08 PROCEDURE — G8482 FLU IMMUNIZE ORDER/ADMIN: HCPCS | Performed by: NURSE PRACTITIONER

## 2023-12-08 PROCEDURE — 1036F TOBACCO NON-USER: CPT | Performed by: NURSE PRACTITIONER

## 2023-12-08 PROCEDURE — G8419 CALC BMI OUT NRM PARAM NOF/U: HCPCS | Performed by: NURSE PRACTITIONER

## 2023-12-08 PROCEDURE — G8427 DOCREV CUR MEDS BY ELIG CLIN: HCPCS | Performed by: NURSE PRACTITIONER

## 2023-12-08 RX ORDER — PREDNISONE 10 MG/1
10 TABLET ORAL DAILY
Qty: 10 TABLET | Refills: 0 | Status: SHIPPED | OUTPATIENT
Start: 2023-12-08 | End: 2023-12-18

## 2023-12-08 ASSESSMENT — ENCOUNTER SYMPTOMS
ABDOMINAL PAIN: 0
SHORTNESS OF BREATH: 1
COUGH: 1
CHEST TIGHTNESS: 1

## 2023-12-08 NOTE — PROGRESS NOTES
tablet 0    MAGNESIUM PO Take by mouth      fluticasone (FLONASE) 50 MCG/ACT nasal spray 1 spray by Each Nostril route daily as needed for Rhinitis (cough) 16 g 0    albuterol sulfate HFA (VENTOLIN HFA) 108 (90 Base) MCG/ACT inhaler Inhale 2 puffs into the lungs 4 times daily as needed for Wheezing 18 g 0    ipratropium 0.5 mg-albuterol 2.5 mg (DUONEB) 0.5-2.5 (3) MG/3ML SOLN nebulizer solution Inhale 3 mLs into the lungs every 6 hours as needed for Shortness of Breath 360 mL 1    Nebulizers (AIRIAL COMPACT MINI NEBULIZER) MISC 1 each by Does not apply route continuous 1 each 0    fluticasone (FLONASE) 50 MCG/ACT nasal spray 1 spray by Each Nostril route in the morning. (Patient taking differently: 1 spray by Each Nostril route daily Taking exclear which is a saline) 16 g 2    Multiple Vitamins-Minerals (MULTIVITAMIN WOMEN) TABS Take 1 tablet by mouth daily       No current facility-administered medications for this visit. Past Medical History:   Diagnosis Date    HELLP (hemolytic anemia/elev liver enzymes/low platelets in pregnancy) 3/7/2018    3/6/18 - 36w1d - emergent C/S    Preeclampsia, severe, third trimester 3/9/2018    Single delivery by  section 3/7/2018    3/6/18 - LTCS for HELLP @ 36 weeks        Review of Systems   Constitutional:  Positive for fatigue. Negative for fever. Respiratory:  Positive for cough, chest tightness and shortness of breath (BLACKBURN). Gastrointestinal:  Negative for abdominal pain. Musculoskeletal:  Positive for myalgias. Psychiatric/Behavioral:  Positive for sleep disturbance. Objective   Physical Exam  Constitutional:       General: She is not in acute distress. HENT:      Head: Normocephalic and atraumatic. Cardiovascular:      Rate and Rhythm: Normal rate and regular rhythm. Pulmonary:      Effort: Pulmonary effort is normal.      Breath sounds: No wheezing. Comments: Dry cough  Skin:     General: Skin is warm and dry.    Neurological:

## 2023-12-10 LAB — BACTERIA BLD CULT: NORMAL

## 2023-12-11 ENCOUNTER — HOSPITAL ENCOUNTER (EMERGENCY)
Age: 33
Discharge: HOME | End: 2023-12-11
Attending: EMERGENCY MEDICINE
Payer: COMMERCIAL

## 2023-12-11 ENCOUNTER — OFFICE VISIT (OUTPATIENT)
Dept: PULMONOLOGY | Age: 33
End: 2023-12-11
Payer: COMMERCIAL

## 2023-12-11 ENCOUNTER — APPOINTMENT (OUTPATIENT)
Dept: GENERAL RADIOLOGY | Age: 33
End: 2023-12-11
Payer: COMMERCIAL

## 2023-12-11 VITALS
HEART RATE: 79 BPM | RESPIRATION RATE: 18 BRPM | WEIGHT: 163.14 LBS | TEMPERATURE: 98.6 F | DIASTOLIC BLOOD PRESSURE: 78 MMHG | BODY MASS INDEX: 27.85 KG/M2 | SYSTOLIC BLOOD PRESSURE: 138 MMHG | HEIGHT: 64 IN | OXYGEN SATURATION: 96 %

## 2023-12-11 VITALS
BODY MASS INDEX: 27.35 KG/M2 | TEMPERATURE: 98.1 F | OXYGEN SATURATION: 96 % | RESPIRATION RATE: 18 BRPM | DIASTOLIC BLOOD PRESSURE: 81 MMHG | WEIGHT: 160.2 LBS | SYSTOLIC BLOOD PRESSURE: 115 MMHG | HEIGHT: 64 IN | HEART RATE: 95 BPM

## 2023-12-11 DIAGNOSIS — R07.81 RIB PAIN ON LEFT SIDE: Primary | ICD-10-CM

## 2023-12-11 DIAGNOSIS — J20.9 ACUTE BRONCHITIS, UNSPECIFIED ORGANISM: Primary | ICD-10-CM

## 2023-12-11 PROCEDURE — G8427 DOCREV CUR MEDS BY ELIG CLIN: HCPCS | Performed by: INTERNAL MEDICINE

## 2023-12-11 PROCEDURE — 71046 X-RAY EXAM CHEST 2 VIEWS: CPT

## 2023-12-11 PROCEDURE — 1036F TOBACCO NON-USER: CPT | Performed by: INTERNAL MEDICINE

## 2023-12-11 PROCEDURE — 6370000000 HC RX 637 (ALT 250 FOR IP): Performed by: EMERGENCY MEDICINE

## 2023-12-11 PROCEDURE — 99283 EMERGENCY DEPT VISIT LOW MDM: CPT

## 2023-12-11 PROCEDURE — G8482 FLU IMMUNIZE ORDER/ADMIN: HCPCS | Performed by: INTERNAL MEDICINE

## 2023-12-11 PROCEDURE — 6370000000 HC RX 637 (ALT 250 FOR IP)

## 2023-12-11 PROCEDURE — G8419 CALC BMI OUT NRM PARAM NOF/U: HCPCS | Performed by: INTERNAL MEDICINE

## 2023-12-11 PROCEDURE — 94664 DEMO&/EVAL PT USE INHALER: CPT | Performed by: INTERNAL MEDICINE

## 2023-12-11 PROCEDURE — 99204 OFFICE O/P NEW MOD 45 MIN: CPT | Performed by: INTERNAL MEDICINE

## 2023-12-11 RX ORDER — LIDOCAINE 50 MG/G
1 PATCH TOPICAL DAILY
Qty: 30 PATCH | Refills: 0 | Status: SHIPPED | OUTPATIENT
Start: 2023-12-11

## 2023-12-11 RX ORDER — NAPROXEN 250 MG/1
500 TABLET ORAL ONCE
Status: COMPLETED | OUTPATIENT
Start: 2023-12-11 | End: 2023-12-11

## 2023-12-11 RX ORDER — LIDOCAINE 4 G/G
1 PATCH TOPICAL DAILY
Status: DISCONTINUED | OUTPATIENT
Start: 2023-12-11 | End: 2023-12-11 | Stop reason: HOSPADM

## 2023-12-11 RX ORDER — CYCLOBENZAPRINE HCL 10 MG
10 TABLET ORAL 3 TIMES DAILY PRN
Qty: 15 TABLET | Refills: 0 | Status: SHIPPED | OUTPATIENT
Start: 2023-12-11 | End: 2023-12-21

## 2023-12-11 RX ORDER — LIDOCAINE 4 G/G
PATCH TOPICAL
Status: DISCONTINUED
Start: 2023-12-11 | End: 2023-12-11 | Stop reason: HOSPADM

## 2023-12-11 RX ORDER — DIAZEPAM 5 MG/1
5 TABLET ORAL ONCE
Status: COMPLETED | OUTPATIENT
Start: 2023-12-11 | End: 2023-12-11

## 2023-12-11 RX ORDER — LIDOCAINE 50 MG/G
1 PATCH TOPICAL DAILY
Qty: 30 PATCH | Refills: 0 | Status: SHIPPED | OUTPATIENT
Start: 2023-12-11 | End: 2023-12-11 | Stop reason: SDUPTHER

## 2023-12-11 RX ORDER — FLUTICASONE FUROATE, UMECLIDINIUM BROMIDE AND VILANTEROL TRIFENATATE 100; 62.5; 25 UG/1; UG/1; UG/1
1 POWDER RESPIRATORY (INHALATION) DAILY
Qty: 1 EACH | Refills: 0 | Status: SHIPPED | COMMUNITY
Start: 2023-12-11

## 2023-12-11 RX ORDER — OXYCODONE HYDROCHLORIDE AND ACETAMINOPHEN 5; 325 MG/1; MG/1
1-2 TABLET ORAL EVERY 6 HOURS PRN
Qty: 7 TABLET | Refills: 0 | Status: SHIPPED | OUTPATIENT
Start: 2023-12-11 | End: 2023-12-18

## 2023-12-11 RX ORDER — NAPROXEN 500 MG/1
500 TABLET ORAL 2 TIMES DAILY
Qty: 20 TABLET | Refills: 0 | Status: SHIPPED | OUTPATIENT
Start: 2023-12-11 | End: 2023-12-21

## 2023-12-11 RX ORDER — PREDNISONE 10 MG/1
TABLET ORAL
Qty: 40 TABLET | Refills: 0 | Status: SHIPPED | OUTPATIENT
Start: 2023-12-11 | End: 2023-12-21

## 2023-12-11 RX ORDER — LIDOCAINE 4 G/G
1 PATCH TOPICAL DAILY
Status: DISCONTINUED | OUTPATIENT
Start: 2023-12-11 | End: 2023-12-11

## 2023-12-11 RX ADMIN — NAPROXEN SODIUM 500 MG: 250 TABLET ORAL at 03:58

## 2023-12-11 RX ADMIN — DIAZEPAM 5 MG: 5 TABLET ORAL at 04:24

## 2023-12-11 ASSESSMENT — PAIN DESCRIPTION - PAIN TYPE: TYPE: ACUTE PAIN

## 2023-12-11 ASSESSMENT — PAIN DESCRIPTION - ORIENTATION: ORIENTATION: LEFT

## 2023-12-11 ASSESSMENT — PAIN - FUNCTIONAL ASSESSMENT
PAIN_FUNCTIONAL_ASSESSMENT: 0-10
PAIN_FUNCTIONAL_ASSESSMENT: PREVENTS OR INTERFERES SOME ACTIVE ACTIVITIES AND ADLS
PAIN_FUNCTIONAL_ASSESSMENT: 0-10

## 2023-12-11 ASSESSMENT — PAIN SCALES - GENERAL
PAINLEVEL_OUTOF10: 5
PAINLEVEL_OUTOF10: 7

## 2023-12-11 ASSESSMENT — PAIN DESCRIPTION - LOCATION: LOCATION: RIB CAGE;BACK

## 2023-12-11 ASSESSMENT — LIFESTYLE VARIABLES
HOW MANY STANDARD DRINKS CONTAINING ALCOHOL DO YOU HAVE ON A TYPICAL DAY: PATIENT DOES NOT DRINK
HOW OFTEN DO YOU HAVE A DRINK CONTAINING ALCOHOL: NEVER

## 2023-12-11 ASSESSMENT — PAIN DESCRIPTION - FREQUENCY: FREQUENCY: CONTINUOUS

## 2023-12-11 ASSESSMENT — PAIN DESCRIPTION - DESCRIPTORS: DESCRIPTORS: SHARP;STABBING

## 2023-12-11 ASSESSMENT — PAIN DESCRIPTION - ONSET: ONSET: SUDDEN

## 2023-12-11 NOTE — ED NOTES
Patient's father arrived to take patient home. Discharged ambulatory with steady gait to ED analisa.       Tara Mccloud., RN  12/11/23 8862

## 2023-12-11 NOTE — ED TRIAGE NOTES
Patient ambulatory to Room 4 with c/o severe sharp left rib under axilla and thoracic back pain that started around 2100 last PM when patient began coughing. She reports she was diagnosed with bronchitis around Thanksgiving. States she had follow up with NP on Friday and was diagnosed with pleurisy. Patient is Day 5 of prednisone and has been taking mucinex, phenergan cough syrup, tessalon pearles and albuterol inhaler and nebulizer along with tylenol and ibuprofen. She reports she took 800 mg of ibuprofen and 1000 mg of tylenol at 2100 last evening. Patient has persistent and frequent non productive cough that increases intensity of her pain. Patient assisted into a gown and Dr. Enrique Peck in room to examine patient. Patient is awake, alert, oriented, shortness of breath noted, c/o pressure with inspirations. Skin w/d, cap refill brisk, denies fevers at home. Denies nausea, vomiting and diarrhea. Patient reports she has an appointment with the pulmonologist this morning.

## 2023-12-11 NOTE — ED PROVIDER NOTES
[address] [facility]  eMERGENCY dEPARTMENT eNCOUnter      Pt Name: Jeniffer Carney  MRN: [id number]  [address] [facility] 1990  Date of evaluation: 2023  Provider: Alo Anguiano MD  PCP: SEE Mojica - NP      CHIEF COMPLAINT       Chief Complaint   Patient presents with    Back Pain     History of bronchitis around gi. Seen by NP on Friday and diagnosed with pleurisy. Tonight coughed around 9 pm and has severe sharp pain in left ribs under axilla to left back. Rib Pain       HISTORY OFPRESENT ILLNESS   (Location/Symptom, Timing/Onset, Context/Setting, Quality, Duration, Modifying Factors,Severity)  Note limiting factors. Jeniffer Carney is a 35 y.o. female presents with complaints of left-sided back pain has been diagnosed with bronchitis and was putting her son to bed the other night when she was turned and coughed and then began feeling a sharp pain under the ribs and under the axilla on the left side no fever no chills    Nursing Notes were all reviewed and agreed with or any disagreements were addressed  in the HPI. REVIEW OF SYSTEMS    (2-9 systems for level 4, 10 or more for level 5)     Review of Systems    Positives and Pertinent negatives as per HPI. Except as noted above in the ROS, all other systems were reviewed andnegative.        PASTMEDICAL HISTORY     Past Medical History:   Diagnosis Date    HELLP (hemolytic anemia/elev liver enzymes/low platelets in pregnancy) 3/7/2018    3/6/18 - 36w1d - emergent C/S    Preeclampsia, severe, third trimester 3/9/2018    Single delivery by  section 3/7/2018    3/6/18 - LTCS for HELLP @ 36 weeks         SURGICAL HISTORY       Past Surgical History:   Procedure Laterality Date    ADENOIDECTOMY AND TYMPANOSTOMY TUBE PLACEMENT       SECTION  2020    TONSILLECTOMY      TONSILLECTOMY AND ADENOIDECTOMY      WISDOM TOOTH EXTRACTION           CURRENT MEDICATIONS       Discharge Medication

## 2023-12-11 NOTE — DISCHARGE INSTRUCTIONS
Discharge home  Alternate ice and heat  Naprosyn for pain  Flexeril as a muscle relaxer  Lidoderm patches  Percocet for breakthrough pain  Follow-up with your pulmonology

## 2023-12-11 NOTE — ED NOTES
Patient called her mother to request a ride home. Discharge instructions reviewed with patient and verbalized understanding, denies further questions and successful teach back occurred. Patient getting dressed and waiting for her ride to get here at this time. Written discharge instructions and prescriptions x4 provided to patient.       Kay Fischer RN  12/11/23 0220

## 2023-12-11 NOTE — PROGRESS NOTES
Pulmonary Critical Care Consult           REASON FOR CONSULTATION:  Chief Complaint   Patient presents with    New Patient     Bronchitis    Cough     Bronchitis with wet cough. Consult at request of SEE Lai NP     PCP: SEE Lai NP        Assessment and Plan:   Diagnosis Orders   1. Acute bronchitis, unspecified organism  fluticasone-umeclidin-vilant (TRELEGY ELLIPTA) 100-62.5-25 MCG/ACT AEPB inhaler    predniSONE (DELTASONE) 10 MG tablet          Plan:  Severe acute bronchitis we will treat with tapered dose of prednisone. She was given a Trelegy sample 100 mcg inhaler use was demonstrated, I suspect she will not need long-term except if she has underlying asthma, we will prescribe if needed. Will arrange for a PFT after the acute symptoms resolved. Advised to continue Flonase          HISTORY OF PRESENT ILLNESS: Oriana Lofton is very pleasant 35y.o. 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 Chest x-ray today with no acute cardiopulmonary pathology  Patient reported that she would get acute bronchitis once a year. No pets.     Past Medical History:   Diagnosis Date    HELLP (hemolytic anemia/elev liver enzymes/low platelets in pregnancy) 3/7/2018    3/6/18 - 36w1d - emergent C/S    Preeclampsia, severe, third trimester 3/9/2018    Single delivery by  section 3/7/2018    3/6/18 - LTCS for HELLP @ 36 weeks       Past Surgical History:   Procedure Laterality Date    ADENOIDECTOMY AND TYMPANOSTOMY TUBE PLACEMENT       SECTION

## 2023-12-28 DIAGNOSIS — J20.9 ACUTE BRONCHITIS, UNSPECIFIED ORGANISM: ICD-10-CM

## 2023-12-29 RX ORDER — FLUTICASONE FUROATE, UMECLIDINIUM BROMIDE AND VILANTEROL TRIFENATATE 100; 62.5; 25 UG/1; UG/1; UG/1
1 POWDER RESPIRATORY (INHALATION) DAILY
Qty: 1 EACH | Refills: 5 | Status: SHIPPED | OUTPATIENT
Start: 2023-12-29

## 2024-01-17 ENCOUNTER — OFFICE VISIT (OUTPATIENT)
Dept: PULMONOLOGY | Age: 34
End: 2024-01-17
Payer: COMMERCIAL

## 2024-01-17 VITALS
OXYGEN SATURATION: 98 % | WEIGHT: 171 LBS | TEMPERATURE: 97.8 F | BODY MASS INDEX: 29.19 KG/M2 | RESPIRATION RATE: 18 BRPM | DIASTOLIC BLOOD PRESSURE: 62 MMHG | HEIGHT: 64 IN | HEART RATE: 88 BPM | SYSTOLIC BLOOD PRESSURE: 104 MMHG

## 2024-01-17 DIAGNOSIS — J98.8 WHEEZING-ASSOCIATED RESPIRATORY INFECTION (WARI): ICD-10-CM

## 2024-01-17 DIAGNOSIS — J40 BRONCHITIS: Primary | ICD-10-CM

## 2024-01-17 PROCEDURE — G8482 FLU IMMUNIZE ORDER/ADMIN: HCPCS | Performed by: INTERNAL MEDICINE

## 2024-01-17 PROCEDURE — 1036F TOBACCO NON-USER: CPT | Performed by: INTERNAL MEDICINE

## 2024-01-17 PROCEDURE — G8427 DOCREV CUR MEDS BY ELIG CLIN: HCPCS | Performed by: INTERNAL MEDICINE

## 2024-01-17 PROCEDURE — 99214 OFFICE O/P EST MOD 30 MIN: CPT | Performed by: INTERNAL MEDICINE

## 2024-01-17 PROCEDURE — G8419 CALC BMI OUT NRM PARAM NOF/U: HCPCS | Performed by: INTERNAL MEDICINE

## 2024-01-17 RX ORDER — FLUTICASONE PROPIONATE 50 MCG
1 SPRAY, SUSPENSION (ML) NASAL DAILY PRN
Qty: 1 EACH | Refills: 3 | Status: SHIPPED | OUTPATIENT
Start: 2024-01-17 | End: 2024-01-27

## 2024-01-17 NOTE — PROGRESS NOTES
Pulmonary progress note           REASON FOR CONSULTATION:  Chief Complaint   Patient presents with    Follow-up     Bronchitis        Consult at request of Kaylin Malagon APRN - NP     PCP: Kaylin Malagon APRN - NP        Assessment and Plan:   Diagnosis Orders   1. Bronchitis  Full PFT Study With Bronchodilator      2. Wheezing-associated respiratory infection (WARI)  fluticasone (FLONASE) 50 MCG/ACT nasal spray            Plan:  Severe acute bronchitis with relapse of symptoms off Trelegy, continue Trelegy, will obtain PFT to r/o asthma  Advised to continue Flonase          HISTORY OF PRESENT ILLNESS: Lindsay Baldwin is very pleasant 33 y.o. 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  Chest x-ray today with no acute cardiopulmonary pathology  Patient reported that she would get acute bronchitis once a year.  No pets.    24:  Here today for follow-up  Currently she has no cough or shortness of breath or wheezing.  He is taking daily Trelegy  When she tried to wean it off she had relapse of her symptoms      Past Medical History:   Diagnosis Date    HELLP (hemolytic anemia/elev liver enzymes/low platelets in pregnancy) 3/7/2018    3/6/18 - 36w1d - emergent C/S    Preeclampsia, severe, third trimester 3/9/2018    Single delivery by  section 3/7/2018    3/6/18 - LTCS for HELLP @ 36 weeks       Past Surgical History:   Procedure Laterality Date    ADENOIDECTOMY AND TYMPANOSTOMY TUBE PLACEMENT       SECTION  2020    TONSILLECTOMY      TONSILLECTOMY AND

## 2024-01-21 DIAGNOSIS — J20.9 ACUTE BRONCHITIS, UNSPECIFIED ORGANISM: ICD-10-CM

## 2024-01-22 RX ORDER — FLUTICASONE FUROATE, UMECLIDINIUM BROMIDE AND VILANTEROL TRIFENATATE 100; 62.5; 25 UG/1; UG/1; UG/1
1 POWDER RESPIRATORY (INHALATION) DAILY
Qty: 1 EACH | Refills: 5 | Status: SHIPPED | OUTPATIENT
Start: 2024-01-22

## 2024-02-19 ENCOUNTER — HOSPITAL ENCOUNTER (OUTPATIENT)
Dept: PULMONOLOGY | Age: 34
Discharge: HOME | End: 2024-02-19
Payer: COMMERCIAL

## 2024-02-19 LAB
DLCO %PRED: 123 %
DLCO PRED: NORMAL
DLCO/VA %PRED: NORMAL
DLCO/VA PRED: NORMAL
DLCO/VA: NORMAL
DLCO: NORMAL
EXPIRATORY TIME-POST: NORMAL
EXPIRATORY TIME: NORMAL
FEF 25-75 %CHNG: NORMAL
FEF 25-75 POST %PRED: NORMAL
FEF 25-75% %PRED-PRE: NORMAL
FEF 25-75% PRED: NORMAL
FEF 25-75-POST: NORMAL
FEF 25-75-PRE: NORMAL
FEV1 %PRED-POST: 107 %
FEV1 %PRED-PRE: 104 %
FEV1 PRED: NORMAL
FEV1-POST: NORMAL
FEV1-PRE: NORMAL
FEV1/FVC %PRED-POST: NORMAL
FEV1/FVC %PRED-PRE: NORMAL
FEV1/FVC PRED: NORMAL
FEV1/FVC-POST: 86 %
FEV1/FVC-PRE: 86 %
FVC %PRED-POST: NORMAL
FVC %PRED-PRE: NORMAL
FVC PRED: NORMAL
FVC-POST: NORMAL
FVC-PRE: NORMAL
GAW %PRED: NORMAL
GAW PRED: NORMAL
GAW: NORMAL
IC PRE %PRED: NORMAL
IC PRED: NORMAL
IC: NORMAL
MEP: NORMAL
MIP: NORMAL
MVV %PRED-PRE: NORMAL
MVV PRED: NORMAL
MVV-PRE: NORMAL
PEF %PRED-POST: NORMAL
PEF %PRED-PRE: NORMAL
PEF PRED: NORMAL
PEF%CHNG: NORMAL
PEF-POST: NORMAL
PEF-PRE: NORMAL
RAW %PRED: NORMAL
RAW PRED: NORMAL
RAW: NORMAL
RV PRE %PRED: NORMAL
RV PRED: NORMAL
RV: NORMAL
SVC %PRED: NORMAL
SVC PRED: NORMAL
SVC: NORMAL
TLC PRE %PRED: 104 %
TLC PRED: NORMAL
TLC: NORMAL
VA %PRED: NORMAL
VA PRED: NORMAL
VA: NORMAL
VTG %PRED: NORMAL
VTG PRED: NORMAL
VTG: NORMAL

## 2024-02-19 PROCEDURE — 94060 EVALUATION OF WHEEZING: CPT

## 2024-02-19 PROCEDURE — 94640 AIRWAY INHALATION TREATMENT: CPT

## 2024-02-19 PROCEDURE — 94729 DIFFUSING CAPACITY: CPT

## 2024-02-19 PROCEDURE — 94726 PLETHYSMOGRAPHY LUNG VOLUMES: CPT

## 2024-02-19 PROCEDURE — 6370000000 HC RX 637 (ALT 250 FOR IP): Performed by: INTERNAL MEDICINE

## 2024-02-19 PROCEDURE — 94664 DEMO&/EVAL PT USE INHALER: CPT

## 2024-02-19 RX ORDER — ALBUTEROL SULFATE 90 UG/1
4 AEROSOL, METERED RESPIRATORY (INHALATION) ONCE
Status: COMPLETED | OUTPATIENT
Start: 2024-02-19 | End: 2024-02-19

## 2024-02-19 RX ADMIN — ALBUTEROL SULFATE 4 PUFF: 90 AEROSOL, METERED RESPIRATORY (INHALATION) at 09:26

## 2024-02-19 ASSESSMENT — PULMONARY FUNCTION TESTS
FEV1_PERCENT_PREDICTED_PRE: 104
FEV1_PERCENT_PREDICTED_POST: 107
FEV1/FVC_PRE: 86
FEV1/FVC_POST: 86

## 2024-02-22 PROBLEM — J40 BRONCHITIS: Status: ACTIVE | Noted: 2024-02-22

## 2024-02-22 NOTE — PROCEDURES
Reason for PFT:  Bronchitis     Spirometry  Spirometry is normal.    Lung Volumes  Lung volumes are normal.    Bronchodilator  There is no response to bronchodilator demonstrated.   [Increase in FEV1 and/or FVC => 12% of control and => 200 ml]    Flow-Volume Loop  The flow-volume loop is normal.    Diffusing Capacity  Diffusing capacity is normal.    Overall Interpretation  No obstruction is present    No restriction is present    There is not a bronchodilator response present    Diffusion capacity is normal    FEV1 is 3.20 L at 104% predicted.    FVC is 3.74 L at 102% predicted    FEV1/FVC ratio is 86    Normal study        Obstruction severity and Restriction Severity using FEV1 %  Gold Stage Severity per ERS/ATS FEV1 % per ERS/ATS   GOLD I:  FEV1>80% Mild COPD >70   GOLD II:  FEV1 50-79% Moderate 50-70   GOLD III:  FEV1 30-49% Severe 35-50   GOLD IV:  FEV1 <30 Very Severe  <35       DCLO:  Normal:  %  Mild:  65-70%  Moderate:  41-60%  Severe:  <40%    PFT data will be scanned into the media tab in epic.    Socrates Barillas DO  [facility]

## 2024-04-09 ENCOUNTER — PATIENT MESSAGE (OUTPATIENT)
Dept: PULMONOLOGY | Age: 34
End: 2024-04-09

## 2024-04-09 NOTE — TELEPHONE ENCOUNTER
From: Lindsay Baldwin  To: Dr. Eric Degroot  Sent: 4/9/2024 2:31 PM EDT  Subject: Trelegy inhaler     I Had my pulmonary function test over a month ago. They told me that someone would call about results. I was just curious after reading my results do I need to stay on this trelegy inhaler? I have 9 mores days on this one and I didn’t want to get another full script if it isn’t needed. My follow up visit with you isn’t for another month or so.    Thanks   Lindsay Baldwin

## 2024-04-24 ENCOUNTER — PATIENT MESSAGE (OUTPATIENT)
Dept: FAMILY MEDICINE CLINIC | Age: 34
End: 2024-04-24

## 2024-04-25 ENCOUNTER — OFFICE VISIT (OUTPATIENT)
Dept: FAMILY MEDICINE CLINIC | Age: 34
End: 2024-04-25
Payer: COMMERCIAL

## 2024-04-25 VITALS
DIASTOLIC BLOOD PRESSURE: 70 MMHG | SYSTOLIC BLOOD PRESSURE: 114 MMHG | OXYGEN SATURATION: 98 % | WEIGHT: 166.8 LBS | HEART RATE: 78 BPM | TEMPERATURE: 97.4 F | HEIGHT: 64 IN | BODY MASS INDEX: 28.48 KG/M2

## 2024-04-25 DIAGNOSIS — R30.0 BURNING WITH URINATION: ICD-10-CM

## 2024-04-25 DIAGNOSIS — R82.998 LEUKOCYTES IN URINE: Primary | ICD-10-CM

## 2024-04-25 LAB
BILIRUBIN, POC: NEGATIVE
BLOOD URINE, POC: NORMAL
CLARITY, POC: NORMAL
COLOR, POC: YELLOW
GLUCOSE URINE, POC: NEGATIVE
KETONES, POC: NEGATIVE
LEUKOCYTE EST, POC: NORMAL
NITRITE, POC: NEGATIVE
PH, POC: 6.5
PROTEIN, POC: NEGATIVE
SPECIFIC GRAVITY, POC: 1.01
UROBILINOGEN, POC: 0.2

## 2024-04-25 PROCEDURE — 99213 OFFICE O/P EST LOW 20 MIN: CPT | Performed by: NURSE PRACTITIONER

## 2024-04-25 PROCEDURE — 1036F TOBACCO NON-USER: CPT | Performed by: NURSE PRACTITIONER

## 2024-04-25 PROCEDURE — G8427 DOCREV CUR MEDS BY ELIG CLIN: HCPCS | Performed by: NURSE PRACTITIONER

## 2024-04-25 PROCEDURE — G8419 CALC BMI OUT NRM PARAM NOF/U: HCPCS | Performed by: NURSE PRACTITIONER

## 2024-04-25 PROCEDURE — 81002 URINALYSIS NONAUTO W/O SCOPE: CPT | Performed by: NURSE PRACTITIONER

## 2024-04-25 RX ORDER — NITROFURANTOIN 25; 75 MG/1; MG/1
100 CAPSULE ORAL 2 TIMES DAILY
Qty: 20 CAPSULE | Refills: 0 | Status: SHIPPED | OUTPATIENT
Start: 2024-04-25 | End: 2024-05-05

## 2024-04-25 RX ORDER — PHENAZOPYRIDINE HYDROCHLORIDE 100 MG/1
100 TABLET, FILM COATED ORAL 3 TIMES DAILY PRN
Qty: 15 TABLET | Refills: 0 | Status: SHIPPED | OUTPATIENT
Start: 2024-04-25 | End: 2024-04-30

## 2024-04-25 ASSESSMENT — PATIENT HEALTH QUESTIONNAIRE - PHQ9
SUM OF ALL RESPONSES TO PHQ QUESTIONS 1-9: 0
SUM OF ALL RESPONSES TO PHQ9 QUESTIONS 1 & 2: 0
SUM OF ALL RESPONSES TO PHQ QUESTIONS 1-9: 0
2. FEELING DOWN, DEPRESSED OR HOPELESS: NOT AT ALL
1. LITTLE INTEREST OR PLEASURE IN DOING THINGS: NOT AT ALL

## 2024-04-25 ASSESSMENT — ENCOUNTER SYMPTOMS
ABDOMINAL PAIN: 1
COUGH: 0
BACK PAIN: 0

## 2024-04-25 NOTE — PROGRESS NOTES
Lindsay Baldwin (:  1990) is a 33 y.o. female,Established patient, here for evaluation of the following chief complaint(s):  Urinary Tract Infection (Past 2 days has had burning with urination, urine frequency. ) and Vaginitis (Having burning, red, irritation. No discharge. Diflucan worked before for the pt. )      Assessment & Plan   ASSESSMENT/PLAN:  1. Leukocytes in urine  New. Send for culture will treat with macrobid.  - Culture, Urine  - nitrofurantoin, macrocrystal-monohydrate, (MACROBID) 100 MG capsule; Take 1 capsule by mouth 2 times daily for 10 days  Dispense: 20 capsule; Refill: 0  - phenazopyridine (PYRIDIUM) 100 MG tablet; Take 1 tablet by mouth 3 times daily as needed for Pain  Dispense: 15 tablet; Refill: 0    2. Burning with urination  New. POCT and check for BV  - POCT Urinalysis no Micro  - VAGINAL PATHOGENS PROBE *A       Return if symptoms worsen or fail to improve.         Subjective   SUBJECTIVE/OBJECTIVE:  HPI  Chief Complaint   Patient presents with    Urinary Tract Infection     Past 2 days has had burning with urination, urine frequency.     Vaginitis     Having burning, red, irritation. No discharge. Diflucan worked before for the pt.        Urgency, frequency and dysuria at the end of urination. States she did a monistat treatment last night. Home test positive for leuk  Denies flank pain, fevers, lower abd cramping due to start menses.     Current Outpatient Medications   Medication Sig Dispense Refill    nitrofurantoin, macrocrystal-monohydrate, (MACROBID) 100 MG capsule Take 1 capsule by mouth 2 times daily for 10 days 20 capsule 0    phenazopyridine (PYRIDIUM) 100 MG tablet Take 1 tablet by mouth 3 times daily as needed for Pain 15 tablet 0    fluticasone (FLONASE) 50 MCG/ACT nasal spray 1 spray by Each Nostril route daily as needed for Rhinitis (cough) 1 each 3    MAGNESIUM PO Take by mouth      albuterol sulfate HFA (VENTOLIN HFA) 108 (90 Base) MCG/ACT inhaler Inhale 2

## 2024-04-26 ENCOUNTER — PATIENT MESSAGE (OUTPATIENT)
Dept: FAMILY MEDICINE CLINIC | Age: 34
End: 2024-04-26

## 2024-04-26 LAB
CANDIDA DNA VAG QL NAA+PROBE: NORMAL
G VAGINALIS DNA SPEC QL NAA+PROBE: NORMAL
T VAGINALIS DNA VAG QL NAA+PROBE: NORMAL

## 2024-04-27 LAB
BACTERIA UR CULT: ABNORMAL
ORGANISM: ABNORMAL

## 2024-04-29 ENCOUNTER — PATIENT MESSAGE (OUTPATIENT)
Dept: FAMILY MEDICINE CLINIC | Age: 34
End: 2024-04-29

## 2024-05-06 ENCOUNTER — OFFICE VISIT (OUTPATIENT)
Dept: PULMONOLOGY | Age: 34
End: 2024-05-06
Payer: COMMERCIAL

## 2024-05-06 VITALS
HEART RATE: 62 BPM | WEIGHT: 168.6 LBS | RESPIRATION RATE: 18 BRPM | SYSTOLIC BLOOD PRESSURE: 118 MMHG | DIASTOLIC BLOOD PRESSURE: 72 MMHG | TEMPERATURE: 97.4 F | HEIGHT: 64 IN | BODY MASS INDEX: 28.79 KG/M2 | OXYGEN SATURATION: 99 %

## 2024-05-06 DIAGNOSIS — J98.8 WHEEZING-ASSOCIATED RESPIRATORY INFECTION (WARI): ICD-10-CM

## 2024-05-06 PROCEDURE — 1036F TOBACCO NON-USER: CPT | Performed by: INTERNAL MEDICINE

## 2024-05-06 PROCEDURE — G8419 CALC BMI OUT NRM PARAM NOF/U: HCPCS | Performed by: INTERNAL MEDICINE

## 2024-05-06 PROCEDURE — G8427 DOCREV CUR MEDS BY ELIG CLIN: HCPCS | Performed by: INTERNAL MEDICINE

## 2024-05-06 PROCEDURE — 99213 OFFICE O/P EST LOW 20 MIN: CPT | Performed by: INTERNAL MEDICINE

## 2024-05-06 RX ORDER — FLUTICASONE PROPIONATE 50 MCG
1 SPRAY, SUSPENSION (ML) NASAL DAILY PRN
Qty: 1 EACH | Refills: 5 | Status: SHIPPED | OUTPATIENT
Start: 2024-05-06 | End: 2024-05-16

## 2024-05-06 NOTE — PROGRESS NOTES
Pulmonary progress note           REASON FOR CONSULTATION:  Chief Complaint   Patient presents with    Follow-up     bronchitis        Consult at request of Kaylin Malagon APRN - MICHAEL     PCP: Kaylin Malagon APRN - NP        Assessment and Plan:   Diagnosis Orders   1. Wheezing-associated respiratory infection (WARI)  fluticasone (FLONASE) 50 MCG/ACT nasal spray              Plan:  Off of Trelegy  Use albuterol as needed  Advised to continue Flonase          HISTORY OF PRESENT ILLNESS: Lindsay Baldwin is very pleasant 33 y.o. 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  Chest x-ray today with no acute cardiopulmonary pathology  Patient reported that she would get acute bronchitis once a year.  No pets.    2024  Here today for follow-up  Currently she has no cough or shortness of breath or wheezing.  Has been off of Trelegy for 1 month  PFTs normal with no active airflow obstruction  She continues to take Flonase        Past Medical History:   Diagnosis Date    HELLP (hemolytic anemia/elev liver enzymes/low platelets in pregnancy) 3/7/2018    3/6/18 - 36w1d - emergent C/S    Preeclampsia, severe, third trimester 3/9/2018    Single delivery by  section 3/7/2018    3/6/18 - LTCS for HELLP @ 36 weeks       Past Surgical History:   Procedure Laterality Date    ADENOIDECTOMY AND TYMPANOSTOMY TUBE PLACEMENT       SECTION  2020    TONSILLECTOMY      TONSILLECTOMY AND ADENOIDECTOMY      WISDOM TOOTH EXTRACTION         family history includes Breast Cancer in her maternal

## 2024-07-15 DIAGNOSIS — R30.0 BURNING WITH URINATION: Primary | ICD-10-CM

## 2024-07-15 RX ORDER — PHENAZOPYRIDINE HYDROCHLORIDE 100 MG/1
100 TABLET, FILM COATED ORAL 3 TIMES DAILY PRN
Qty: 30 TABLET | Refills: 0 | Status: SHIPPED | OUTPATIENT
Start: 2024-07-15 | End: 2024-07-25

## 2024-07-15 RX ORDER — NITROFURANTOIN 25; 75 MG/1; MG/1
100 CAPSULE ORAL 2 TIMES DAILY
Qty: 20 CAPSULE | Refills: 0 | Status: SHIPPED | OUTPATIENT
Start: 2024-07-15 | End: 2024-07-25

## 2025-01-09 ENCOUNTER — PATIENT MESSAGE (OUTPATIENT)
Dept: FAMILY MEDICINE CLINIC | Age: 35
End: 2025-01-09

## 2025-01-29 ENCOUNTER — OFFICE VISIT (OUTPATIENT)
Dept: FAMILY MEDICINE CLINIC | Age: 35
End: 2025-01-29
Payer: COMMERCIAL

## 2025-01-29 VITALS
HEART RATE: 92 BPM | WEIGHT: 168.8 LBS | OXYGEN SATURATION: 99 % | DIASTOLIC BLOOD PRESSURE: 84 MMHG | BODY MASS INDEX: 28.82 KG/M2 | TEMPERATURE: 97.7 F | HEIGHT: 64 IN | SYSTOLIC BLOOD PRESSURE: 122 MMHG

## 2025-01-29 DIAGNOSIS — J40 BRONCHITIS: Primary | ICD-10-CM

## 2025-01-29 DIAGNOSIS — J98.8 WHEEZING-ASSOCIATED RESPIRATORY INFECTION (WARI): ICD-10-CM

## 2025-01-29 PROCEDURE — 1036F TOBACCO NON-USER: CPT | Performed by: NURSE PRACTITIONER

## 2025-01-29 PROCEDURE — G8427 DOCREV CUR MEDS BY ELIG CLIN: HCPCS | Performed by: NURSE PRACTITIONER

## 2025-01-29 PROCEDURE — 99213 OFFICE O/P EST LOW 20 MIN: CPT | Performed by: NURSE PRACTITIONER

## 2025-01-29 PROCEDURE — G8419 CALC BMI OUT NRM PARAM NOF/U: HCPCS | Performed by: NURSE PRACTITIONER

## 2025-01-29 RX ORDER — BUDESONIDE AND FORMOTEROL FUMARATE DIHYDRATE 160; 4.5 UG/1; UG/1
2 AEROSOL RESPIRATORY (INHALATION) 2 TIMES DAILY
Qty: 10.2 G | Refills: 3 | Status: SHIPPED | OUTPATIENT
Start: 2025-01-29

## 2025-01-29 RX ORDER — DOXYCYCLINE HYCLATE 100 MG
100 TABLET ORAL 2 TIMES DAILY
Qty: 14 TABLET | Refills: 0 | Status: SHIPPED | OUTPATIENT
Start: 2025-01-29 | End: 2025-02-05

## 2025-01-29 RX ORDER — ALBUTEROL SULFATE 90 UG/1
2 INHALANT RESPIRATORY (INHALATION) 4 TIMES DAILY PRN
Qty: 18 G | Refills: 0 | Status: SHIPPED | OUTPATIENT
Start: 2025-01-29

## 2025-01-29 RX ORDER — METHYLPREDNISOLONE 4 MG/1
TABLET ORAL
Qty: 1 KIT | Refills: 0 | Status: SHIPPED | OUTPATIENT
Start: 2025-01-29 | End: 2025-02-04

## 2025-01-29 SDOH — ECONOMIC STABILITY: FOOD INSECURITY: WITHIN THE PAST 12 MONTHS, THE FOOD YOU BOUGHT JUST DIDN'T LAST AND YOU DIDN'T HAVE MONEY TO GET MORE.: NEVER TRUE

## 2025-01-29 SDOH — ECONOMIC STABILITY: FOOD INSECURITY: WITHIN THE PAST 12 MONTHS, YOU WORRIED THAT YOUR FOOD WOULD RUN OUT BEFORE YOU GOT MONEY TO BUY MORE.: NEVER TRUE

## 2025-01-29 ASSESSMENT — ENCOUNTER SYMPTOMS
SHORTNESS OF BREATH: 1
WHEEZING: 1
COUGH: 1
ABDOMINAL PAIN: 0
CHEST TIGHTNESS: 1

## 2025-01-29 ASSESSMENT — PATIENT HEALTH QUESTIONNAIRE - PHQ9
1. LITTLE INTEREST OR PLEASURE IN DOING THINGS: NOT AT ALL
SUM OF ALL RESPONSES TO PHQ QUESTIONS 1-9: 0
SUM OF ALL RESPONSES TO PHQ QUESTIONS 1-9: 0
2. FEELING DOWN, DEPRESSED OR HOPELESS: NOT AT ALL
SUM OF ALL RESPONSES TO PHQ QUESTIONS 1-9: 0
SUM OF ALL RESPONSES TO PHQ9 QUESTIONS 1 & 2: 0
SUM OF ALL RESPONSES TO PHQ QUESTIONS 1-9: 0

## 2025-01-29 NOTE — PROGRESS NOTES
Lindsay Baldwin (:  1990) is a 34 y.o. female,Established patient, here for evaluation of the following chief complaint(s):  Congestion (Scratchy throat, thick yellowish bloody mucous, chest discomfort, sob )      Assessment & Plan   ASSESSMENT/PLAN:  1. Bronchitis  New. Get the cheaper inhaler. Keep annual appt with Dr. Stuart  - budesonide-formoterol (SYMBICORT) 160-4.5 MCG/ACT AERO; Inhale 2 puffs into the lungs 2 times daily  Dispense: 10.2 g; Refill: 3  - fluticasone-umeclidin-vilant (TRELEGY ELLIPTA) 100-62.5-25 MCG/ACT AEPB inhaler; Inhale 1 puff into the lungs daily  Dispense: 60 each; Refill: 0  - doxycycline hyclate (VIBRA-TABS) 100 MG tablet; Take 1 tablet by mouth 2 times daily for 7 days  Dispense: 14 tablet; Refill: 0    2. Wheezing-associated respiratory infection (WARI)  New. Get the cheaper inhaler. Keep annual appt with Dr. Stuart  - budesonide-formoterol (SYMBICORT) 160-4.5 MCG/ACT AERO; Inhale 2 puffs into the lungs 2 times daily  Dispense: 10.2 g; Refill: 3  - fluticasone-umeclidin-vilant (TRELEGY ELLIPTA) 100-62.5-25 MCG/ACT AEPB inhaler; Inhale 1 puff into the lungs daily  Dispense: 60 each; Refill: 0  - methylPREDNISolone (MEDROL DOSEPACK) 4 MG tablet; Take by mouth.  Dispense: 1 kit; Refill: 0       Return if symptoms worsen or fail to improve.         Subjective   SUBJECTIVE/OBJECTIVE:  HPI  Chief Complaint   Patient presents with    Congestion     Scratchy throat, thick yellowish bloody mucous, chest discomfort, sob    Similar symptoms last year, tested negative for asthma, normal PFT. States she was off trelegy and doing fine. Recent symptoms started Saturday. States using nebulizer BID and albuterol rescue  inhaler in between. Using something every 4 hrs. Denies fevers, abd pain, n/v. Endorses ear pressure, using flonase all month of January.   Around sick kids    Current Outpatient Medications   Medication Sig Dispense Refill    budesonide-formoterol (SYMBICORT) 160-4.5

## 2025-01-30 ENCOUNTER — TELEPHONE (OUTPATIENT)
Dept: FAMILY MEDICINE CLINIC | Age: 35
End: 2025-01-30

## 2025-01-30 DIAGNOSIS — Z13.220 LIPID SCREENING: ICD-10-CM

## 2025-01-30 DIAGNOSIS — Z00.00 ANNUAL PHYSICAL EXAM: Primary | ICD-10-CM

## 2025-01-30 NOTE — TELEPHONE ENCOUNTER
Pt has a cpe on 2/25 and she needs bw orders placed in Epic for this appt. Pl advise pt when this has been done.

## 2025-02-07 DIAGNOSIS — Z00.00 ANNUAL PHYSICAL EXAM: ICD-10-CM

## 2025-02-07 DIAGNOSIS — Z13.220 LIPID SCREENING: ICD-10-CM

## 2025-02-07 LAB
ALBUMIN SERPL-MCNC: 4 G/DL (ref 3.4–5)
ALBUMIN/GLOB SERPL: 1.9 {RATIO} (ref 1.1–2.2)
ALP SERPL-CCNC: 58 U/L (ref 40–129)
ALT SERPL-CCNC: 26 U/L (ref 10–40)
ANION GAP SERPL CALCULATED.3IONS-SCNC: 8 MMOL/L (ref 3–16)
AST SERPL-CCNC: 23 U/L (ref 15–37)
BILIRUB SERPL-MCNC: 0.5 MG/DL (ref 0–1)
BUN SERPL-MCNC: 13 MG/DL (ref 7–20)
CALCIUM SERPL-MCNC: 8.8 MG/DL (ref 8.3–10.6)
CHLORIDE SERPL-SCNC: 103 MMOL/L (ref 99–110)
CHOLEST SERPL-MCNC: 158 MG/DL (ref 0–199)
CO2 SERPL-SCNC: 28 MMOL/L (ref 21–32)
CREAT SERPL-MCNC: 0.8 MG/DL (ref 0.6–1.1)
GFR SERPLBLD CREATININE-BSD FMLA CKD-EPI: >90 ML/MIN/{1.73_M2}
GLUCOSE SERPL-MCNC: 88 MG/DL (ref 70–99)
HDLC SERPL-MCNC: 41 MG/DL (ref 40–60)
LDL CHOLESTEROL: 96 MG/DL
POTASSIUM SERPL-SCNC: 4.1 MMOL/L (ref 3.5–5.1)
PROT SERPL-MCNC: 6.1 G/DL (ref 6.4–8.2)
SODIUM SERPL-SCNC: 139 MMOL/L (ref 136–145)
TRIGL SERPL-MCNC: 103 MG/DL (ref 0–150)
VLDLC SERPL CALC-MCNC: 21 MG/DL

## 2025-02-10 ENCOUNTER — PATIENT MESSAGE (OUTPATIENT)
Dept: FAMILY MEDICINE CLINIC | Age: 35
End: 2025-02-10

## 2025-02-24 ENCOUNTER — OFFICE VISIT (OUTPATIENT)
Dept: FAMILY MEDICINE CLINIC | Age: 35
End: 2025-02-24
Payer: COMMERCIAL

## 2025-02-24 VITALS
HEART RATE: 77 BPM | WEIGHT: 170.6 LBS | SYSTOLIC BLOOD PRESSURE: 104 MMHG | BODY MASS INDEX: 29.12 KG/M2 | OXYGEN SATURATION: 98 % | HEIGHT: 64 IN | TEMPERATURE: 97.8 F | DIASTOLIC BLOOD PRESSURE: 74 MMHG

## 2025-02-24 DIAGNOSIS — Z00.00 ANNUAL PHYSICAL EXAM: Primary | ICD-10-CM

## 2025-02-24 DIAGNOSIS — J40 BRONCHITIS: ICD-10-CM

## 2025-02-24 DIAGNOSIS — J98.8 WHEEZING-ASSOCIATED RESPIRATORY INFECTION (WARI): ICD-10-CM

## 2025-02-24 PROCEDURE — 99395 PREV VISIT EST AGE 18-39: CPT | Performed by: NURSE PRACTITIONER

## 2025-02-24 RX ORDER — FLUTICASONE FUROATE, UMECLIDINIUM BROMIDE AND VILANTEROL TRIFENATATE 100; 62.5; 25 UG/1; UG/1; UG/1
POWDER RESPIRATORY (INHALATION)
Qty: 60 EACH | Refills: 1 | Status: SHIPPED | OUTPATIENT
Start: 2025-02-24

## 2025-02-24 ASSESSMENT — ENCOUNTER SYMPTOMS
ABDOMINAL PAIN: 0
SHORTNESS OF BREATH: 0
COUGH: 0

## 2025-02-24 NOTE — PROGRESS NOTES
Lindsay Baldwin (:  1990) is a 34 y.o. female,Established patient, here for evaluation of the following chief complaint(s):  Annual Exam (Annual exam and had labs done )      Assessment & Plan   ASSESSMENT/PLAN:  1. Annual physical exam  Reviewed labs and care gaps.      Return for yearly physical.         Subjective   SUBJECTIVE/OBJECTIVE:  HPI  Presents today for annual physical. PMH sig for bronchitis.   [facility] Denies birth control, menses normal, no concerns. Due for PAP/cervical ca screen  Low protein level on lab>Shakeology shake for breakfast, tries to walk, jog and weights.      UTD on vision and dental.   Works part time as dental hygienist  Lives at home with  and kids. Independent in in ADLs.     Current Outpatient Medications   Medication Sig Dispense Refill    fluticasone-umeclidin-vilant (TRELEGY ELLIPTA) 100-62.5-25 MCG/ACT AEPB inhaler INHALE 1 PUFF BY MOUTH DAILY 60 each 1    CRANBERRY PO Take by mouth      albuterol sulfate HFA (VENTOLIN HFA) 108 (90 Base) MCG/ACT inhaler Inhale 2 puffs into the lungs 4 times daily as needed for Wheezing 18 g 0    fluticasone (FLONASE) 50 MCG/ACT nasal spray 1 spray by Each Nostril route daily as needed for Rhinitis (cough) 1 each 5    MAGNESIUM PO Take by mouth      Nebulizers (AIRIAL COMPACT MINI NEBULIZER) MISC 1 each by Does not apply route continuous 1 each 0    Multiple Vitamins-Minerals (MULTIVITAMIN WOMEN) TABS Take 1 tablet by mouth daily       No current facility-administered medications for this visit.        Past Medical History:   Diagnosis Date    Headache     HELLP (hemolytic anemia/elev liver enzymes/low platelets in pregnancy) 2018    3/6/18 - 36w1d - emergent C/S    Preeclampsia, severe, third trimester 2018    Single delivery by  section 2018    3/6/18 - LTCS for HELLP @ 36 weeks        Review of Systems   Constitutional:  Negative for fever and unexpected weight